# Patient Record
Sex: FEMALE | URBAN - METROPOLITAN AREA
[De-identification: names, ages, dates, MRNs, and addresses within clinical notes are randomized per-mention and may not be internally consistent; named-entity substitution may affect disease eponyms.]

---

## 2018-08-01 ENCOUNTER — RECORDS - HEALTHEAST (OUTPATIENT)
Dept: LAB | Facility: CLINIC | Age: 83
End: 2018-08-01

## 2018-08-02 LAB
ANION GAP SERPL CALCULATED.3IONS-SCNC: 8 MMOL/L (ref 5–18)
BUN SERPL-MCNC: 23 MG/DL (ref 8–28)
CALCIUM SERPL-MCNC: 9.5 MG/DL (ref 8.5–10.5)
CHLORIDE BLD-SCNC: 103 MMOL/L (ref 98–107)
CO2 SERPL-SCNC: 27 MMOL/L (ref 22–31)
CREAT SERPL-MCNC: 0.7 MG/DL (ref 0.6–1.1)
GFR SERPL CREATININE-BSD FRML MDRD: >60 ML/MIN/1.73M2
GLUCOSE BLD-MCNC: 80 MG/DL (ref 70–125)
POTASSIUM BLD-SCNC: 4 MMOL/L (ref 3.5–5)
SODIUM SERPL-SCNC: 138 MMOL/L (ref 136–145)

## 2019-09-04 ENCOUNTER — OFFICE VISIT (OUTPATIENT)
Dept: INTERNAL MEDICINE | Facility: CLINIC | Age: 84
End: 2019-09-04

## 2019-09-04 VITALS
HEART RATE: 73 BPM | DIASTOLIC BLOOD PRESSURE: 74 MMHG | RESPIRATION RATE: 22 BRPM | SYSTOLIC BLOOD PRESSURE: 130 MMHG | OXYGEN SATURATION: 93 % | TEMPERATURE: 97.3 F | WEIGHT: 109.6 LBS

## 2019-09-04 DIAGNOSIS — H40.9 GLAUCOMA OF RIGHT EYE, UNSPECIFIED GLAUCOMA TYPE: ICD-10-CM
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
## 2019-09-04 DIAGNOSIS — I10 ESSENTIAL HYPERTENSION: ICD-10-CM

## 2019-09-04 DIAGNOSIS — M25.561 CHRONIC PAIN OF RIGHT KNEE: ICD-10-CM

## 2019-09-04 DIAGNOSIS — K21.9 GASTROESOPHAGEAL REFLUX DISEASE, ESOPHAGITIS PRESENCE NOT SPECIFIED: Primary | ICD-10-CM

## 2019-09-04 DIAGNOSIS — M85.80 OSTEOPENIA, UNSPECIFIED LOCATION: ICD-10-CM

## 2019-09-04 DIAGNOSIS — H25.013 CATARACT CORTICAL, SENILE, BILATERAL: ICD-10-CM

## 2019-09-04 DIAGNOSIS — M25.511 CHRONIC PAIN IN RIGHT SHOULDER: ICD-10-CM

## 2019-09-04 DIAGNOSIS — I63.9 CEREBROVASCULAR ACCIDENT (CVA), UNSPECIFIED MECHANISM (HCC): ICD-10-CM

## 2019-09-04 DIAGNOSIS — G89.29 CHRONIC PAIN OF RIGHT KNEE: ICD-10-CM

## 2019-09-04 DIAGNOSIS — Z13.220 LIPID SCREENING: ICD-10-CM

## 2019-09-04 DIAGNOSIS — N39.0 FREQUENT UTI: ICD-10-CM

## 2019-09-04 DIAGNOSIS — Z85.3 HISTORY OF BREAST CANCER: ICD-10-CM

## 2019-09-04 DIAGNOSIS — R53.81 PHYSICAL DECONDITIONING: ICD-10-CM

## 2019-09-04 DIAGNOSIS — G89.29 CHRONIC PAIN IN RIGHT SHOULDER: ICD-10-CM

## 2019-09-04 DIAGNOSIS — H35.30 MACULAR DEGENERATION OF BOTH EYES, UNSPECIFIED TYPE: ICD-10-CM

## 2019-09-04 LAB
ALBUMIN SERPL-MCNC: 4.9 G/DL (ref 3.5–5.2)
ALBUMIN/GLOB SERPL: 1.8 G/DL
ALP SERPL-CCNC: 76 U/L (ref 39–117)
ALT SERPL W P-5'-P-CCNC: 10 U/L (ref 1–33)
ANION GAP SERPL CALCULATED.3IONS-SCNC: 14.4 MMOL/L (ref 5–15)
AST SERPL-CCNC: 19 U/L (ref 1–32)
BASOPHILS # BLD AUTO: 0.02 10*3/MM3 (ref 0–0.2)
BASOPHILS NFR BLD AUTO: 0.4 % (ref 0–1.5)
BILIRUB SERPL-MCNC: <0.2 MG/DL (ref 0.2–1.2)
BUN BLD-MCNC: 22 MG/DL (ref 8–23)
BUN/CREAT SERPL: 22.9 (ref 7–25)
CALCIUM SPEC-SCNC: 9.4 MG/DL (ref 8.2–9.6)
CHLORIDE SERPL-SCNC: 92 MMOL/L (ref 98–107)
CHOLEST SERPL-MCNC: 173 MG/DL (ref 0–200)
CO2 SERPL-SCNC: 24.6 MMOL/L (ref 22–29)
CREAT BLD-MCNC: 0.96 MG/DL (ref 0.57–1)
DEPRECATED RDW RBC AUTO: 51.1 FL (ref 37–54)
EOSINOPHIL # BLD AUTO: 0.14 10*3/MM3 (ref 0–0.4)
EOSINOPHIL NFR BLD AUTO: 2.5 % (ref 0.3–6.2)
ERYTHROCYTE [DISTWIDTH] IN BLOOD BY AUTOMATED COUNT: 13.3 % (ref 12.3–15.4)
GFR SERPL CREATININE-BSD FRML MDRD: 54 ML/MIN/1.73
GLOBULIN UR ELPH-MCNC: 2.7 GM/DL
GLUCOSE BLD-MCNC: 77 MG/DL (ref 65–99)
HCT VFR BLD AUTO: 38.7 % (ref 34–46.6)
HDLC SERPL-MCNC: 58 MG/DL (ref 40–60)
HGB BLD-MCNC: 12.3 G/DL (ref 12–15.9)
IMM GRANULOCYTES # BLD AUTO: 0.02 10*3/MM3 (ref 0–0.05)
IMM GRANULOCYTES NFR BLD AUTO: 0.4 % (ref 0–0.5)
LDLC SERPL CALC-MCNC: 79 MG/DL (ref 0–100)
LDLC/HDLC SERPL: 1.36 {RATIO}
LYMPHOCYTES # BLD AUTO: 1.04 10*3/MM3 (ref 0.7–3.1)
LYMPHOCYTES NFR BLD AUTO: 18.4 % (ref 19.6–45.3)
MCH RBC QN AUTO: 32.9 PG (ref 26.6–33)
MCHC RBC AUTO-ENTMCNC: 31.8 G/DL (ref 31.5–35.7)
MCV RBC AUTO: 103.5 FL (ref 79–97)
MONOCYTES # BLD AUTO: 0.8 10*3/MM3 (ref 0.1–0.9)
MONOCYTES NFR BLD AUTO: 14.2 % (ref 5–12)
NEUTROPHILS # BLD AUTO: 3.63 10*3/MM3 (ref 1.7–7)
NEUTROPHILS NFR BLD AUTO: 64.1 % (ref 42.7–76)
NRBC BLD AUTO-RTO: 0 /100 WBC (ref 0–0.2)
PLATELET # BLD AUTO: 253 10*3/MM3 (ref 140–450)
PMV BLD AUTO: 9.8 FL (ref 6–12)
POTASSIUM BLD-SCNC: 4.2 MMOL/L (ref 3.5–5.2)
PROT SERPL-MCNC: 7.6 G/DL (ref 6–8.5)
RBC # BLD AUTO: 3.74 10*6/MM3 (ref 3.77–5.28)
SODIUM BLD-SCNC: 131 MMOL/L (ref 136–145)
TRIGL SERPL-MCNC: 181 MG/DL (ref 0–150)
VLDLC SERPL-MCNC: 36.2 MG/DL (ref 5–40)
WBC NRBC COR # BLD: 5.65 10*3/MM3 (ref 3.4–10.8)

## 2019-09-04 PROCEDURE — 80053 COMPREHEN METABOLIC PANEL: CPT | Performed by: NURSE PRACTITIONER

## 2019-09-04 PROCEDURE — 99204 OFFICE O/P NEW MOD 45 MIN: CPT | Performed by: NURSE PRACTITIONER

## 2019-09-04 PROCEDURE — 80061 LIPID PANEL: CPT | Performed by: NURSE PRACTITIONER

## 2019-09-04 PROCEDURE — 85025 COMPLETE CBC W/AUTO DIFF WBC: CPT | Performed by: NURSE PRACTITIONER

## 2019-09-04 PROCEDURE — 81003 URINALYSIS AUTO W/O SCOPE: CPT | Performed by: NURSE PRACTITIONER

## 2019-09-04 PROCEDURE — 36415 COLL VENOUS BLD VENIPUNCTURE: CPT | Performed by: NURSE PRACTITIONER

## 2019-09-04 RX ORDER — AMLODIPINE BESYLATE 10 MG/1
TABLET ORAL
COMMUNITY
Start: 2019-08-07 | End: 2019-11-14 | Stop reason: SDUPTHER

## 2019-09-04 RX ORDER — METOPROLOL SUCCINATE 50 MG/1
TABLET, EXTENDED RELEASE ORAL
COMMUNITY
Start: 2019-06-19 | End: 2020-02-04 | Stop reason: SDUPTHER

## 2019-09-04 RX ORDER — CEPHALEXIN 250 MG/1
CAPSULE ORAL
COMMUNITY
Start: 2019-07-19 | End: 2019-10-01 | Stop reason: SDUPTHER

## 2019-09-04 RX ORDER — DORZOLAMIDE HCL 20 MG/ML
SOLUTION/ DROPS OPHTHALMIC
COMMUNITY
Start: 2019-07-03 | End: 2020-01-21

## 2019-09-04 RX ORDER — PANTOPRAZOLE SODIUM 40 MG/1
TABLET, DELAYED RELEASE ORAL
COMMUNITY
Start: 2019-07-16 | End: 2019-10-21 | Stop reason: SDUPTHER

## 2019-09-04 RX ORDER — BENAZEPRIL HYDROCHLORIDE 40 MG/1
TABLET, FILM COATED ORAL
COMMUNITY
Start: 2019-08-07 | End: 2019-11-14 | Stop reason: SDUPTHER

## 2019-09-04 NOTE — PROGRESS NOTES
"Chief Complaint   Patient presents with   • Establish Care   • Recent Fall   • Urinary Tract Infection   • Med Refill        Subjective     History of Present Illness     Patient is here today to establish care.      bp up mild today.     The patient has hypertension and takes Lotensin, Norvasc, and metoprolol.  Denies any chest pain shortness of breath swelling.  Patient has a history of CVA.  CVA was one year ago -inpt to [facility] and did not have a neurologist.  Patient has osteopenia- Calcium vitamin D daily, muscle strengthening. But not lately due to weqakness especially since recent 2.5 weeks ago.  Patient has GERD and takes pantoprazole daily.  scholtsky ring was noted when she was inpt for cva and had scholstky ring and repeat egd the ring was gone. She was never symptomatic with gerd sxs. Patient has frequent UTIs and is on Keflex daily.  She was started by urology. Symptoms have been helpful but did not have resolution completely and she has been a lot at  wheich usuallyher symptoms and her daughter wants this checked today.  Usually keep records of how many times voiding at  but recently she has not been doing this.  Patient has bilateral cataracts.  Patient has history of breast cancer of R mastectomy not followed by chemo radiation not in nodes and had at [facility].she was dismessed form followup at [place] .  Patient also has history of fractures-year ago R wirst and pubic bone \"cracks\" fractures 5 years ago.     Patient has had a fall approximately 2.5 weeks ago did not seek care. She was using walker when fell and was turning and fell backwards.   Head hit wood floor and hit L elbow which has tiny scab and not hurtign now.  She hit back of head and had bump which has now resolved.     She overall has had weakness.  L side from cva.  R knee pain no treatment waers brace. Severe arthritis R shoulder R knee.  They do home exercises.         Allergic to Bactrim?  Reaction    Immunizations shingles " 3/19 at [facility]; flu 9/18 due    Health screenings DEXA bone scan 1/19; ophthalmology exam 7/19; mammogram winter 2018 or 19    Social history she is  and retired no alcohol use no drug use no smoking    Surgical history tonsillectomy age 12; mastectomy 2009; partial thyroidectomy age 48; dental work with teeth extraction age 44    Family medical history Father arthritis; mother high blood pressure; mother stroke; heart attack       Current Outpatient Medications:   •  amLODIPine (NORVASC) 10 MG tablet, , Disp: , Rfl:   •  benazepril (LOTENSIN) 40 MG tablet, , Disp: , Rfl:   •  cephalexin (KEFLEX) 250 MG capsule, , Disp: , Rfl:   •  dorzolamide (TRUSOPT) 2 % ophthalmic solution, , Disp: , Rfl:   •  metoprolol succinate XL (TOPROL-XL) 50 MG 24 hr tablet, , Disp: , Rfl:   •  Multiple Vitamins-Minerals (CENTRUM SILVER PO), Take  by mouth., Disp: , Rfl:   •  Multiple Vitamins-Minerals (PRESERVISION AREDS PO), Take  by mouth., Disp: , Rfl:   •  pantoprazole (PROTONIX) 40 MG EC tablet, , Disp: , Rfl:     No problem-specific Assessment & Plan notes found for this encounter.      The following portions of the patient's history were reviewed and updated as appropriate: allergies, current medications, past family history, past medical history, past social history, past surgical history and problem list.    Review of Systems   Constitutional: Negative for activity change, appetite change, chills, fatigue and fever.   HENT: Negative for congestion, postnasal drip and sore throat.    Eyes: Negative for visual disturbance.   Respiratory: Negative for cough and shortness of breath.    Cardiovascular: Negative for chest pain, palpitations and leg swelling.   Gastrointestinal: Negative for abdominal pain, constipation, diarrhea, nausea and GERD.   Musculoskeletal: Negative for arthralgias and myalgias.   Skin: Negative for rash.   Allergic/Immunologic: Negative for environmental allergies.   Neurological: Negative for  dizziness.   Psychiatric/Behavioral: Negative for sleep disturbance.   All other systems reviewed and are negative.      Objective   Physical Exam   Constitutional: She is oriented to person, place, and time. She appears well-developed and well-nourished.   HENT:   Head: Normocephalic and atraumatic.   Right Ear: External ear normal.   Left Ear: External ear normal.   Nose: Nose normal.   Mouth/Throat: Oropharynx is clear and moist.   Neck: No thyromegaly present.   Cardiovascular: Normal rate, regular rhythm and intact distal pulses.   Pulmonary/Chest: Effort normal and breath sounds normal.   Abdominal: Soft. Bowel sounds are normal. She exhibits no distension. There is no tenderness.   Musculoskeletal: She exhibits no edema.   Lymphadenopathy:     She has no cervical adenopathy.   Neurological: She is alert and oriented to person, place, and time.   Skin: Capillary refill takes 2 to 3 seconds.   Psychiatric: She has a normal mood and affect. Her behavior is normal.   Nursing note and vitals reviewed.          No results found for this or any previous visit.     Assessment/Plan   Anita was seen today for establish care, recent fall, urinary tract infection and med refill.    Diagnoses and all orders for this visit:    Gastroesophageal reflux disease, esophagitis presence not specified  -     CBC & Differential  -     Comprehensive Metabolic Panel    Cataract cortical, senile, bilateral    Frequent UTI  -     POC Urinalysis Dipstick, Automated  -     Ambulatory Referral to Urology    History of breast cancer    Fall, initial encounter    Essential hypertension  -     Comprehensive Metabolic Panel  -     POC Urinalysis Dipstick, Automated    Osteopenia, unspecified location    Cerebrovascular accident (CVA), unspecified mechanism (CMS/HCC)    Physical deconditioning  -     Ambulatory Referral to Physical Therapy Evaluate and treat    Chronic pain of right knee  -     Ambulatory Referral to Orthopedic  Surgery    Chronic pain in right shoulder  -     Ambulatory Referral to Orthopedic Surgery    Glaucoma of right eye, unspecified glaucoma type  -     Ambulatory Referral to Ophthalmology    Macular degeneration of both eyes, unspecified type  -     Ambulatory Referral to Ophthalmology    Lipid screening  -     Lipid Panel    addendum  Reviewed with patient option for home health due to CVA multiple chronic joint pains and limited with macular degeneration and glaucoma.  Patient return call today are interested in home health I will set that up.  Return in about 1 month (around 10/4/2019) for Recheck.  RTC/call  If symptoms worsen  Meds MOA and SE's reviewed and pt v/u

## 2019-09-05 LAB
BILIRUB BLD-MCNC: NEGATIVE MG/DL
CLARITY, POC: ABNORMAL
COLOR UR: YELLOW
EXPIRATION DATE: ABNORMAL
GLUCOSE UR STRIP-MCNC: NEGATIVE MG/DL
KETONES UR QL: NEGATIVE
LEUKOCYTE EST, POC: ABNORMAL
Lab: ABNORMAL
NITRITE UR-MCNC: NEGATIVE MG/ML
PH UR: 7 [PH] (ref 5–8)
PROT UR STRIP-MCNC: NEGATIVE MG/DL
RBC # UR STRIP: ABNORMAL /UL
SP GR UR: 1 (ref 1–1.03)
UROBILINOGEN UR QL: NORMAL

## 2019-09-05 PROCEDURE — 87086 URINE CULTURE/COLONY COUNT: CPT | Performed by: NURSE PRACTITIONER

## 2019-09-05 PROCEDURE — 81015 MICROSCOPIC EXAM OF URINE: CPT | Performed by: NURSE PRACTITIONER

## 2019-09-06 ENCOUNTER — TELEPHONE (OUTPATIENT)
Dept: INTERNAL MEDICINE | Facility: CLINIC | Age: 84
End: 2019-09-06

## 2019-09-06 ENCOUNTER — HOSPITAL ENCOUNTER (OUTPATIENT)
Dept: CT IMAGING | Facility: HOSPITAL | Age: 84
Discharge: HOME | End: 2019-09-06
Admitting: NURSE PRACTITIONER

## 2019-09-06 DIAGNOSIS — M54.41 ACUTE BILATERAL LOW BACK PAIN WITH BILATERAL SCIATICA: ICD-10-CM

## 2019-09-06 DIAGNOSIS — M54.41 ACUTE BILATERAL LOW BACK PAIN WITH BILATERAL SCIATICA: Primary | ICD-10-CM

## 2019-09-06 DIAGNOSIS — M54.42 ACUTE BILATERAL LOW BACK PAIN WITH BILATERAL SCIATICA: ICD-10-CM

## 2019-09-06 DIAGNOSIS — M54.42 ACUTE BILATERAL LOW BACK PAIN WITH BILATERAL SCIATICA: Primary | ICD-10-CM

## 2019-09-06 PROCEDURE — 74176 CT ABD & PELVIS W/O CONTRAST: CPT

## 2019-09-06 NOTE — TELEPHONE ENCOUNTER
Pt's daughter was notified. Please see other telephone note. She will be getting phone call in next hour or two with CT information. Nothing further needed from this message.

## 2019-09-06 NOTE — TELEPHONE ENCOUNTER
Chaparrita Pepper calling for her mother Anita Cook. She wants to know if she can get a stronger pain medicine than the extra strength tylenol for her arthritis in legs that radiate up her neck and to her arms. Chaparrita said at her appt this week when she rated her pain Anita said a 4 but is in so much pain that she can barely walk. If she needs to be seen for this she would like to get her in today. Chaparrita can be reached at [phone]  She uses the [facility] on [address]

## 2019-09-06 NOTE — TELEPHONE ENCOUNTER
Spoke with pt's daughter. After sending this message Rosa rcvd results from [facility] and patient may have a kidney stone according to the lab work. She advised a STAT CT scan for patient, daughter is agreeable. She verbalized understanding that if we cannot get scheduled today she should take pt to the ER rather than waiting for Monday.

## 2019-09-06 NOTE — TELEPHONE ENCOUNTER
----- Message from SHANTA Ray sent at 9/6/2019  2:39 PM EDT -----  CT hematuria protocol stat for worsening lumbar pain in the legs.  Pain quick onset with hematuria.  Urine culture negative.

## 2019-10-01 ENCOUNTER — TELEPHONE (OUTPATIENT)
Dept: INTERNAL MEDICINE | Facility: CLINIC | Age: 84
End: 2019-10-01

## 2019-10-01 ENCOUNTER — OFFICE VISIT (OUTPATIENT)
Dept: INTERNAL MEDICINE | Facility: CLINIC | Age: 84
End: 2019-10-01

## 2019-10-01 VITALS
WEIGHT: 110 LBS | DIASTOLIC BLOOD PRESSURE: 62 MMHG | SYSTOLIC BLOOD PRESSURE: 120 MMHG | HEART RATE: 70 BPM | OXYGEN SATURATION: 97 % | RESPIRATION RATE: 14 BRPM

## 2019-10-01 DIAGNOSIS — G89.29 CHRONIC PAIN IN RIGHT SHOULDER: ICD-10-CM

## 2019-10-01 DIAGNOSIS — M25.562 CHRONIC PAIN OF BOTH KNEES: ICD-10-CM

## 2019-10-01 DIAGNOSIS — M25.511 CHRONIC PAIN IN RIGHT SHOULDER: ICD-10-CM

## 2019-10-01 DIAGNOSIS — G89.29 CHRONIC PAIN OF BOTH KNEES: ICD-10-CM

## 2019-10-01 DIAGNOSIS — Z87.440 HISTORY OF RECURRENT UTI (URINARY TRACT INFECTION): Primary | ICD-10-CM

## 2019-10-01 DIAGNOSIS — L98.9 SKIN LESION: ICD-10-CM

## 2019-10-01 DIAGNOSIS — M25.561 CHRONIC PAIN OF BOTH KNEES: ICD-10-CM

## 2019-10-01 PROCEDURE — G0008 ADMIN INFLUENZA VIRUS VAC: HCPCS | Performed by: NURSE PRACTITIONER

## 2019-10-01 PROCEDURE — 90653 IIV ADJUVANT VACCINE IM: CPT | Performed by: NURSE PRACTITIONER

## 2019-10-01 PROCEDURE — 99214 OFFICE O/P EST MOD 30 MIN: CPT | Performed by: NURSE PRACTITIONER

## 2019-10-01 RX ORDER — CEPHALEXIN 250 MG/1
250 CAPSULE ORAL DAILY
Qty: 30 CAPSULE | Refills: 11 | Status: SHIPPED | OUTPATIENT
Start: 2019-10-01 | End: 2020-07-13

## 2019-10-01 NOTE — PROGRESS NOTES
Chief Complaint   Patient presents with   • Fall     subsequent encounter, pt still very weak, pt is in PT and OT        Subjective     History of Present Illness   Patient is here today for follow-up on recent [facility] care visit.    Patient is living at home.  The patient has chronic shoulder and knee pain she is awaiting injection from orthopedics.    Patient has hematuria.  Culture was negative.  Patient does have chronic UTIs and takes Keflex prophylactically.  She is requesting a refill today.  Patient had CT of the abdomen and pelvis which was negative.  She is followed with urology where she lived previously and thinks she has had a work-up in the past.  I will obtain records.  She has urology consult placed at her last visit.    Patient has a history of stroke and a week on the left side.     Patient is recently seen podiatry for left foot third nail which was removed due to recent trauma of the nail.  She is recovering well.    Patient is new skin finding that she was evaluated.  Areas noted is been no treatment the area does not itch and is not painful.  There is been no trauma.    She reports that intermittently she has a lump in her right upper quadrant.  There is no associated nausea vomiting diarrhea mild discomfort.  No relieving factors.        The following portions of the patient's history were reviewed and updated as appropriate: allergies, current medications, past family history, past medical history, past social history, past surgical history and problem list.    Review of Systems   Constitutional: Negative for activity change, appetite change, chills, fatigue and fever.   HENT: Negative for congestion, postnasal drip and sore throat.    Eyes: Negative for visual disturbance.   Respiratory: Negative for cough and shortness of breath.    Cardiovascular: Negative for chest pain, palpitations and leg swelling.   Gastrointestinal: Positive for abdominal pain. Negative for constipation, diarrhea,  nausea and GERD.   Musculoskeletal: Positive for arthralgias and back pain. Negative for myalgias.   Skin: Positive for skin lesions. Negative for rash.   Allergic/Immunologic: Negative for environmental allergies.   Neurological: Negative for dizziness.   Psychiatric/Behavioral: Negative for sleep disturbance.   All other systems reviewed and are negative.      Objective   Physical Exam   Constitutional: She is oriented to person, place, and time. She appears well-developed and well-nourished.   HENT:   Head: Normocephalic and atraumatic.   Cardiovascular: Normal rate and regular rhythm.   Pulmonary/Chest: Effort normal and breath sounds normal.   Abdominal: Soft. Bowel sounds are normal. She exhibits no distension and no mass. There is no tenderness. There is no rebound and no guarding. No hernia.   Musculoskeletal:   Shoulders bilaterally.  There is no erythema edema warmth of the shoulder bilaterally.   Neurological: She is alert and oriented to person, place, and time.   Skin: Skin is warm and dry. Capillary refill takes 2 to 3 seconds.   Half centimeter red scaly area nontender   Psychiatric: She has a normal mood and affect. Her behavior is normal.   Nursing note and vitals reviewed.         Assessment/Plan   Anita was seen today for fall.    Diagnoses and all orders for this visit:    History of recurrent UTI (urinary tract infection)  -     cephalexin (KEFLEX) 250 MG capsule; Take 1 capsule by mouth Daily.    Chronic pain in right shoulder  -     diclofenac (VOLTAREN) 1 % gel gel; Apply 4 g topically to the appropriate area as directed 4 (Four) Times a Day.    Chronic pain of both knees  -     diclofenac (VOLTAREN) 1 % gel gel; Apply 4 g topically to the appropriate area as directed 4 (Four) Times a Day.    Skin lesion  -     Ambulatory Referral to Dermatology    Other orders  -     Fluad Tri 65yr+    hematuria-awaiting records from urology-[facility] in [facility] in [facility]      Skin lesion likely advanced AK versus SCC/BCC    Return for Medicare Wellnes in 2 months AND 4 months a fasting follow up .  RTC/call  If symptoms worsen  Meds MOA and SE's reviewed and pt v/u

## 2019-10-01 NOTE — TELEPHONE ENCOUNTER
Spoke with Aidan at patients local [facility]. He states he can see where patient rcvd vaccine in [place] 06/18/2018. Provided [phone] as the phone number for that pharmacy. I called but pharmacy is closed and will re open at 2pm central time. I will call back later. For now chart has been updated.

## 2019-10-17 ENCOUNTER — OFFICE VISIT (OUTPATIENT)
Dept: ORTHOPEDIC SURGERY | Facility: CLINIC | Age: 84
End: 2019-10-17

## 2019-10-17 VITALS — HEART RATE: 81 BPM | WEIGHT: 110.01 LBS | BODY MASS INDEX: 21.6 KG/M2 | HEIGHT: 60 IN | OXYGEN SATURATION: 95 %

## 2019-10-17 DIAGNOSIS — G89.29 CHRONIC PAIN OF BOTH KNEES: Primary | ICD-10-CM

## 2019-10-17 DIAGNOSIS — M25.512 CHRONIC PAIN OF BOTH SHOULDERS: ICD-10-CM

## 2019-10-17 DIAGNOSIS — M19.011 PRIMARY OSTEOARTHRITIS OF BOTH SHOULDERS: ICD-10-CM

## 2019-10-17 DIAGNOSIS — M19.012 PRIMARY OSTEOARTHRITIS OF BOTH SHOULDERS: ICD-10-CM

## 2019-10-17 DIAGNOSIS — M25.562 CHRONIC PAIN OF BOTH KNEES: Primary | ICD-10-CM

## 2019-10-17 DIAGNOSIS — M25.511 CHRONIC PAIN OF BOTH SHOULDERS: ICD-10-CM

## 2019-10-17 DIAGNOSIS — M17.0 PRIMARY OSTEOARTHRITIS OF BOTH KNEES: ICD-10-CM

## 2019-10-17 DIAGNOSIS — M25.561 CHRONIC PAIN OF BOTH KNEES: Primary | ICD-10-CM

## 2019-10-17 DIAGNOSIS — G89.29 CHRONIC PAIN OF BOTH SHOULDERS: ICD-10-CM

## 2019-10-17 PROCEDURE — 99204 OFFICE O/P NEW MOD 45 MIN: CPT | Performed by: PHYSICIAN ASSISTANT

## 2019-10-17 PROCEDURE — 20610 DRAIN/INJ JOINT/BURSA W/O US: CPT | Performed by: PHYSICIAN ASSISTANT

## 2019-10-17 RX ORDER — ASPIRIN 81 MG/1
81 TABLET, CHEWABLE ORAL DAILY
COMMUNITY

## 2019-10-17 RX ORDER — IBUPROFEN 200 MG
200 TABLET ORAL EVERY 6 HOURS PRN
COMMUNITY

## 2019-10-17 RX ORDER — ACETAMINOPHEN 500 MG
500 TABLET ORAL EVERY 6 HOURS PRN
COMMUNITY

## 2019-10-17 RX ADMIN — LIDOCAINE HYDROCHLORIDE 4 ML: 10 INJECTION, SOLUTION EPIDURAL; INFILTRATION; INTRACAUDAL; PERINEURAL at 14:23

## 2019-10-17 RX ADMIN — TRIAMCINOLONE ACETONIDE 40 MG: 40 INJECTION, SUSPENSION INTRA-ARTICULAR; INTRAMUSCULAR at 14:23

## 2019-10-17 NOTE — PROGRESS NOTES
[facility] [place] [facility] Note    Subjective     Chief Complaint   Patient presents with   • Right Knee - Pain   • Left Knee - Pain        HPI  Anita Cook is a 89 y.o. female.  Patient presents for evaluation bilateral knees and shoulders.  Patient a unknown history of osteoarthritis to knees and shoulders, which she has been receiving corticosteroid injections every 3-4 months for some time.  Injections have only been given to right knee and shoulder, she reports good relief with injections for about 2 months.    Currently right knee and shoulder pain is flaring up again.  Last injections in July 2019 by prior provider in [place].  Reported pain scale 5/10.  Severity of pain moderate.  Quality of pain dull, aching, shooting (shoulder).  Pain associated with popping, grinding, stiffness and giving away.  Knee pain worse with walking, standing, stairs.  Shoulder pain worse with lifting, reaching.  No numbness or tingling into the extremities.  Using ibuprofen, Tylenol and injections for pain control.    History of stroke in July 2018 which affected her left side.    No reported fevers, chills, night sweats or other constitutional symptoms.    Past Medical History:   Diagnosis Date   • Arthritis    • Breast cancer (CMS/HCC)    • Cataracts, bilateral    • Fractures    • Frequent UTI    • GERD (gastroesophageal reflux disease)    • Glaucoma    • Hypertension    • Low bone density    • Stroke (cerebrum) (CMS/HCC)       Past Surgical History:   Procedure Laterality Date   • MASTECTOMY     • MULTIPLE TOOTH EXTRACTIONS     • THYROIDECTOMY, PARTIAL     • TONSILLECTOMY        Family History   Problem Relation Age of Onset   • Hypertension Mother    • Stroke Mother    • Arthritis Father    • Stroke Maternal Aunt      Social History     Socioeconomic History   • Marital status:      Spouse name: Not on file   • Number of children: Not on file   • Years of education: Not on file   • Highest education level:  Not on file   Tobacco Use   • Smoking status: Never Smoker   • Smokeless tobacco: Never Used   Substance and Sexual Activity   • Alcohol use: No     Frequency: Never   • Drug use: No      Current Outpatient Medications on File Prior to Visit   Medication Sig Dispense Refill   • acetaminophen (TYLENOL) 500 MG tablet Take 500 mg by mouth Every 6 (Six) Hours As Needed for Mild Pain .     • amLODIPine (NORVASC) 10 MG tablet      • aspirin 81 MG chewable tablet Chew 81 mg Daily.     • benazepril (LOTENSIN) 40 MG tablet      • Calcium-Phosphorus-Vitamin D (CITRACAL +D3 PO) Take 400 mg by mouth 4 (Four) Times a Day.     • cephalexin (KEFLEX) 250 MG capsule Take 1 capsule by mouth Daily. 30 capsule 11   • diclofenac (VOLTAREN) 1 % gel gel Apply 4 g topically to the appropriate area as directed 4 (Four) Times a Day. 100 g 2   • dorzolamide (TRUSOPT) 2 % ophthalmic solution      • ibuprofen (ADVIL,MOTRIN) 200 MG tablet Take 200 mg by mouth Every 6 (Six) Hours As Needed for Mild Pain .     • metoprolol succinate XL (TOPROL-XL) 50 MG 24 hr tablet      • Multiple Vitamins-Minerals (CENTRUM SILVER PO) Take  by mouth.     • Multiple Vitamins-Minerals (PRESERVISION AREDS PO) Take  by mouth.     • pantoprazole (PROTONIX) 40 MG EC tablet        No current facility-administered medications on file prior to visit.       Allergies   Allergen Reactions   • Bactrim [Sulfamethoxazole-Trimethoprim] Rash        The following portions of the patient's history were reviewed and updated as appropriate: allergies, current medications, past family history, past medical history, past social history, past surgical history and problem list.    Review of Systems   Constitutional: Positive for activity change.   HENT: Negative.    Eyes: Negative.    Respiratory: Negative.    Cardiovascular: Negative.    Gastrointestinal: Negative.    Endocrine: Negative.    Genitourinary: Negative.    Musculoskeletal: Positive for arthralgias (knee pain), gait problem  "and joint swelling.   Skin: Negative.    Allergic/Immunologic: Negative.    Hematological: Negative.    Psychiatric/Behavioral: Negative.         Objective      Physical Exam  Pulse 81   Ht 152.4 cm (60\")   Wt 49.9 kg (110 lb 0.2 oz)   SpO2 95%   BMI 21.48 kg/m²     Body mass index is 21.48 kg/m².    GENERAL APPEARANCE: awake, alert & oriented x 3, in no acute distress and well developed, well nourished  PSYCH: normal mood and affect  LUNGS:  breathing nonlabored, no wheezing  EYES: sclera anicteric, pupils equal  CARDIOVASCULAR: palpable pulses radius bilaterally. Capillary refill less than 2 seconds  INTEGUMENTARY: skin intact, no clubbing, cyanosis  NEUROLOGIC:  Normal Sensation        Ortho Exam  Bilateral Knee  Alignment: R valgus, L mild varus  Skin: Intact without any erythema, warmth, swelling or evidence of infection.  Motion: R 5-120 with crepitus, L 0-120 with crepitus.  Tenderness: R--lateral joint line, lateral soft tissue structures.  L--mild medial joint line.  Instability: L--Lachman  negative. Varus and valgus stress test negative.  R--Lachman negative, Valgus negative, Varus laxity LCL with endpoint  Meniscus: Shira's positive pain lateral right knee; negative left knee  Patella: Grind positive.  Strength: 4-/5 quads/HS  Motor: Grossly intact Q/HS/TA/GS/EHL/P  Sensory: Grossly intact DP/SP/S/S/T nerve distributions.   Vascular: 2+ DP/PT  pulses with brisk capillary refill into each digit.    Bilateral Shoulder  Skin: Intact without any erythema, warmth or swelling.    Tenderness: R--global tenderness including ACJ, L--minimal anterior and lateral shoulder and mild to ACJ.  Motion: R--limited secondary to severe end-stage OA.  L--, ER 45   No provacative testing secondary to pain  Motor: Grossly intact to Ax/MSC/R/U/M/AIN/PIN  Sensory: Grossly intact to Ax/MSC/R/U/M nerve distributions.  Vascular: 2+ radial pulse with brisk capillary refill into each digit.      Imaging/Studies  Ordered " bilateral knee plain films.  Images read by Dr. Leyva.    Imaging Results (last 7 days)     Procedure Component Value Units Date/Time    XR Shoulder 2+ View Bilateral [[id number]] Resulted:  10/17/19 1647     Updated:  10/17/19 1649    Narrative:       Indication: Bilateral shoulder pain    Comparison: No prior xrays are available for comparison      Impression:            Right shoulder 3 views: Radiographs demonstrate diffuse rotator cuff   arthropathy with proximal migration of the humeral head relative to the   glenoid.  There is medial erosion of the glenohumeral joint.  There   appears to be some slight eccentric wear posteriorly at the glenohumeral   articulation    Left shoulder 3 views: Radiographs demonstrate severe glenohumeral   arthritis with bone-on-bone articulation at the central portion of the   glenohumeral articulation.  There does appear to be significant arthritic   changes involving the glenohumeral joint with osteophytes at the inferior   portion of the humeral head and glenoid.    Bilateral shoulders demonstrate no discrete acute process or fracture.      XR Knee 4+ View Bilateral [[id number]] Resulted:  10/17/19 1646     Updated:  10/17/19 1647    Narrative:       Indication: Bilateral knee pain    Comparison: No prior xrays are available for comparison      Impression:            Right Knee: moderate to severe tricompartmental arthritis with genu valgum   alignment, periarticular osteophytes visualized in all compartments   Left Knee: mild tricompartmental osteoarthritis            Assessment/Plan        ICD-10-CM ICD-9-CM   1. Chronic pain of both knees M25.561 719.46    M25.562 338.29    G89.29    2. Chronic pain of both shoulders M25.511 719.41    G89.29 338.29    M25.512    3. Primary osteoarthritis of both knees M17.0 715.16   4. Primary osteoarthritis of both shoulders M19.011 715.11    M19.012        Orders Placed This Encounter   Procedures   • Large Joint Arthrocentesis: R knee   •  XR Knee 4+ View Bilateral   • XR Shoulder 2+ View Bilateral        -Chronic bilateral knee and shoulder pain due osteoarthritis.  -Offered and accepted corticosteroid to right knee.  Injection given today.  Left knee has never had injections and its doing okay today.  -Right shoulder still okay, patient will probably need to return in 2-3 weeks for right GHJ injection.  -Patient attended PT after stroke and is still doing exercises taught by PT to all extremities.  Declines formal PT as this time.  -Continue with over-the-counter pain medication as needed.  Currently taking ibuprofen and Tylenol.  -Follow up for knee in 6 weeks, sooner for shoulder if needed.  -Questions and concerns answered.    After discussing the risks, benefits, indications of injection, the patient gave consent to proceed.  Her right knee was confirmed as the correct joint to be injected with a timeout.  It was then prepped using Hibiclens and injected with a mixture of 4 cc of 1% plain lidocaine and 1 cc of Kenalog (40 mg per mL), without any resistance through the anterior medial approach, patient in seated position.  Area was cleaned, hemostasis was achieved and a Band-Aid was applied over the injection site.  The patient tolerated procedure well.  I instructed the patient on signs and symptoms of infection.  They should report to the emergency department or return to clinic if any of these develop, for further evaluation and treatment.  Recommended modifying activity for the next 48 hours to include rest, ice, elevation and oral pain medication as needed.      History, exam and imaging discussed with Dr. Leyva, who agreed with assessment and plan.      Medical Decision Making  Management Options : over-the-counter medicine and prescription/IM medicine  Data/Risk: radiology tests       Cecy Camacho PA-C  10/21/19  9:13 PM         EMR Dragon/Transcription disclaimer:  Much of this encounter note is an electronic transcription of  spoken language to printed text. Electronic transcription of spoken language may permit erroneous, or at times, nonsensical words or phrases to be inadvertently transcribed. Although I have reviewed the note for such errors, some may still exist.

## 2019-10-17 NOTE — PROGRESS NOTES
Procedure   Large Joint Arthrocentesis: R knee  Date/Time: 10/17/2019 2:23 PM  Consent given by: patient  Site marked: site marked  Timeout: Immediately prior to procedure a time out was called to verify the correct patient, procedure, equipment, support staff and site/side marked as required   Supporting Documentation  Indications: pain   Procedure Details  Location: knee - R knee  Preparation: Patient was prepped and draped in the usual sterile fashion  Needle size: 22 G  Approach: anterolateral  Medications administered: 40 mg triamcinolone acetonide 40 MG/ML; 4 mL lidocaine PF 1% 1 %  Patient tolerance: patient tolerated the procedure well with no immediate complications

## 2019-10-18 ENCOUNTER — TELEPHONE (OUTPATIENT)
Dept: INTERNAL MEDICINE | Facility: CLINIC | Age: 84
End: 2019-10-18

## 2019-10-18 NOTE — TELEPHONE ENCOUNTER
Has home health started care for pt?    Referral was placed 9/14/19 and I have sent several messages to check on

## 2019-10-21 RX ORDER — TRIAMCINOLONE ACETONIDE 40 MG/ML
40 INJECTION, SUSPENSION INTRA-ARTICULAR; INTRAMUSCULAR
Status: COMPLETED | OUTPATIENT
Start: 2019-10-17 | End: 2019-10-17

## 2019-10-21 RX ORDER — LIDOCAINE HYDROCHLORIDE 10 MG/ML
4 INJECTION, SOLUTION EPIDURAL; INFILTRATION; INTRACAUDAL; PERINEURAL
Status: COMPLETED | OUTPATIENT
Start: 2019-10-17 | End: 2019-10-17

## 2019-10-21 NOTE — TELEPHONE ENCOUNTER
Patient's daughter requested a 90 day supply be called into her pharmacy. She had this prescribed by her old PCP and it hasn't been filled by you yet. Okay to send in?

## 2019-10-21 NOTE — TELEPHONE ENCOUNTER
Spoke with patient's daughter. She stated that she doesn't think they need the home health anymore. She will discuss it again at her follow up appointment in December.

## 2019-10-21 NOTE — TELEPHONE ENCOUNTER
M at [phone] for Chaparrita, patient's daughter to return our call, office number was provided

## 2019-10-29 RX ORDER — PANTOPRAZOLE SODIUM 40 MG/1
40 TABLET, DELAYED RELEASE ORAL DAILY
Qty: 90 TABLET | Refills: 3 | Status: SHIPPED | OUTPATIENT
Start: 2019-10-29 | End: 2020-02-04 | Stop reason: SDUPTHER

## 2019-11-14 DIAGNOSIS — M25.561 CHRONIC PAIN OF BOTH KNEES: ICD-10-CM

## 2019-11-14 DIAGNOSIS — G89.29 CHRONIC PAIN IN RIGHT SHOULDER: ICD-10-CM

## 2019-11-14 DIAGNOSIS — M25.511 CHRONIC PAIN IN RIGHT SHOULDER: ICD-10-CM

## 2019-11-14 DIAGNOSIS — M25.562 CHRONIC PAIN OF BOTH KNEES: ICD-10-CM

## 2019-11-14 DIAGNOSIS — G89.29 CHRONIC PAIN OF BOTH KNEES: ICD-10-CM

## 2019-11-14 RX ORDER — AMLODIPINE BESYLATE 10 MG/1
10 TABLET ORAL DAILY
Qty: 30 TABLET | Refills: 5 | Status: SHIPPED | OUTPATIENT
Start: 2019-11-14 | End: 2020-02-04 | Stop reason: SDUPTHER

## 2019-11-14 RX ORDER — BENAZEPRIL HYDROCHLORIDE 40 MG/1
40 TABLET, FILM COATED ORAL DAILY
Qty: 30 TABLET | Refills: 5 | Status: SHIPPED | OUTPATIENT
Start: 2019-11-14 | End: 2020-02-04 | Stop reason: SDUPTHER

## 2019-12-05 ENCOUNTER — OFFICE VISIT (OUTPATIENT)
Dept: ORTHOPEDIC SURGERY | Facility: CLINIC | Age: 84
End: 2019-12-05

## 2019-12-05 VITALS — OXYGEN SATURATION: 92 % | HEART RATE: 85 BPM | BODY MASS INDEX: 21.6 KG/M2 | HEIGHT: 60 IN | WEIGHT: 110.01 LBS

## 2019-12-05 DIAGNOSIS — M25.512 CHRONIC PAIN OF BOTH SHOULDERS: ICD-10-CM

## 2019-12-05 DIAGNOSIS — G89.29 CHRONIC PAIN OF BOTH SHOULDERS: ICD-10-CM

## 2019-12-05 DIAGNOSIS — M25.511 CHRONIC PAIN OF BOTH SHOULDERS: ICD-10-CM

## 2019-12-05 DIAGNOSIS — M17.0 PRIMARY OSTEOARTHRITIS OF BOTH KNEES: ICD-10-CM

## 2019-12-05 DIAGNOSIS — G89.29 CHRONIC PAIN OF BOTH KNEES: Primary | ICD-10-CM

## 2019-12-05 DIAGNOSIS — M19.011 PRIMARY OSTEOARTHRITIS OF BOTH SHOULDERS: ICD-10-CM

## 2019-12-05 DIAGNOSIS — M25.561 CHRONIC PAIN OF BOTH KNEES: Primary | ICD-10-CM

## 2019-12-05 DIAGNOSIS — M19.012 PRIMARY OSTEOARTHRITIS OF BOTH SHOULDERS: ICD-10-CM

## 2019-12-05 DIAGNOSIS — M25.562 CHRONIC PAIN OF BOTH KNEES: Primary | ICD-10-CM

## 2019-12-05 PROCEDURE — 99213 OFFICE O/P EST LOW 20 MIN: CPT | Performed by: PHYSICIAN ASSISTANT

## 2019-12-05 NOTE — PROGRESS NOTES
"    [facility] [place] Orthopaedic Surgery Clinic Note    Subjective     CC: Follow-up (7 weeks- Primary osteoarthritis of both knees, Primary osteoarthritis of both shoulders)      HPI    Anita Cook is a 89 y.o. female.  Patient returns today for follow-up bilateral shoulder pain and right knee pain.  She has a known history of osteoarthritis to the knees and shoulders which she has been receiving corticosteroid injections to every 3 or 4 months.  She notes couple months of relief following these injections.    Currently she endorses a pain scale of 6/10.  Severity the pain moderate.  Quality the pain dull.  Associated symptoms grinding, stiffness and giving way.  Symptoms are worse with walking, standing and stair climbing she currently uses extra strength Tylenol and Voltaren gel for pain control.  Patient uses both walker and wheelchair to assist with ambulation secondary to the pain in her knee.      ROS:    Constiutional:Pt denies fever, chills, nausea, or vomiting.  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    Patient is well nourished and well developed.        Ortho Exam  Limited exam of bilateral shoulders and right knee  Palpable tenderness to all joints stated above.  Positive grinding and crepitus noted with any range of motion.  Range of motion of the shoulders as well as knee limited secondary to pain.  Motor/sensory remains grossly intact C5-T1, upper extremities and L2-S1, lower extremity.      Imaging/Labs/EMG Reviewed:  No new imaging today.      Assessment:  1. Chronic pain " of both knees    2. Chronic pain of both shoulders    3. Primary osteoarthritis of both knees    4. Primary osteoarthritis of both shoulders        Plan:  1. Chronic bilateral shoulder and knee pain due to osteoarthritis to all joints.  Positive end-stage arthritis noted to both shoulders as well as right knee.  2. Offered to provide the patient with bilateral corticosteroid injections to each shoulder.  She declined today because she has to travel to [place] this weekend and does not want to have the injections at this point.  She wishes to return next week for the injections.  3. Unfortunately is too soon for a corticosteroid injection to the right knee.  He even though she does have end-stage bone-on-bone osteoarthritis will try Visco supplementation series to the right knee.  Depending on her response would consider possible referral for pain management for genicular blocks.  4. Her family did ask about possible right TKA.  Spoke with Dr. Leyva and unfortunately based on age and comorbidities she is not a candidate for this procedure.  5. Patient follow-up next Thursday for bilateral shoulder corticosteroid injections and start of Visco supplementation series to right knee.  6. Questions and concerns answered.      Cecy Camacho PA-C  12/09/19  9:29 AM

## 2019-12-12 ENCOUNTER — OFFICE VISIT (OUTPATIENT)
Dept: ORTHOPEDIC SURGERY | Facility: CLINIC | Age: 84
End: 2019-12-12

## 2019-12-12 VITALS — HEIGHT: 60 IN | OXYGEN SATURATION: 96 % | HEART RATE: 86 BPM | WEIGHT: 110.01 LBS | BODY MASS INDEX: 21.6 KG/M2

## 2019-12-12 DIAGNOSIS — M25.562 CHRONIC PAIN OF BOTH KNEES: ICD-10-CM

## 2019-12-12 DIAGNOSIS — M19.011 PRIMARY OSTEOARTHRITIS OF BOTH SHOULDERS: ICD-10-CM

## 2019-12-12 DIAGNOSIS — M19.012 PRIMARY OSTEOARTHRITIS OF BOTH SHOULDERS: ICD-10-CM

## 2019-12-12 DIAGNOSIS — M25.561 CHRONIC PAIN OF BOTH KNEES: ICD-10-CM

## 2019-12-12 DIAGNOSIS — M17.0 PRIMARY OSTEOARTHRITIS OF BOTH KNEES: Primary | ICD-10-CM

## 2019-12-12 DIAGNOSIS — G89.29 CHRONIC PAIN OF BOTH SHOULDERS: ICD-10-CM

## 2019-12-12 DIAGNOSIS — M25.511 CHRONIC PAIN OF BOTH SHOULDERS: ICD-10-CM

## 2019-12-12 DIAGNOSIS — G89.29 CHRONIC PAIN OF BOTH KNEES: ICD-10-CM

## 2019-12-12 DIAGNOSIS — M25.512 CHRONIC PAIN OF BOTH SHOULDERS: ICD-10-CM

## 2019-12-12 PROCEDURE — 20610 DRAIN/INJ JOINT/BURSA W/O US: CPT | Performed by: PHYSICIAN ASSISTANT

## 2019-12-12 PROCEDURE — 99213 OFFICE O/P EST LOW 20 MIN: CPT | Performed by: PHYSICIAN ASSISTANT

## 2019-12-12 RX ADMIN — TRIAMCINOLONE ACETONIDE 40 MG: 40 INJECTION, SUSPENSION INTRA-ARTICULAR; INTRAMUSCULAR at 13:43

## 2019-12-12 RX ADMIN — LIDOCAINE HYDROCHLORIDE 4 ML: 10 INJECTION, SOLUTION EPIDURAL; INFILTRATION; INTRACAUDAL; PERINEURAL at 13:43

## 2019-12-12 NOTE — PROGRESS NOTES
"    [facility] Orthopa[facility] Surgery Clinic Note    Subjective     CC: Follow-up of the Left Shoulder (1 week follow up; Primary osteoarthritis of both shoulders //); Follow-up of the Right Shoulder (1 week follow up; Primary osteoarthritis of both shoulders ); Follow-up of the Right Knee (1 week follow up; right knee pain); and Pain of the Left Knee      HPI    Anita Cook is a 89 y.o. female.  Patient returns today to begin Visco supplementation to her right knee as well as bilateral corticosteroid injections to the shoulders.  However at this time her left shoulder is not bothering her significantly so she declines corticosteroid injection left shoulder but is requesting Visco supplementation to bilateral knees with a known history of osteoarthritis.    New today, patient reports that yesterday she did fall onto her right shoulder.  Currently she notes pain to the proximal humerus.  It is improving.  Will check x-ray to rule out fracture prior to administering corticosteroid injection.      ROS:    Constiutional:Pt denies fever, chills, nausea, or vomiting.  MSK:as above    Objective      Past Medical History  Past Medical History:   Diagnosis Date   • Arthritis    • Breast cancer (CMS/HCC)    • Cataracts, bilateral    • Fractures    • Frequent UTI    • GERD (gastroesophageal reflux disease)    • Glaucoma    • Hypertension    • Low bone density    • Stroke (cerebrum) (CMS/HCC)          Physical Exam  Pulse 86   Ht 152.4 cm (60\")   Wt 49.9 kg (110 lb 0.2 oz)   SpO2 96%   BMI 21.48 kg/m²     Body mass index is 21.48 kg/m².    Patient is well nourished and well developed.        Ortho Exam  Right Shoulder  Skin: Intact without any erythema, warmth or swelling.    Tenderness: R--patient has global tenderness in the shoulder to include ACJ but she also notes tenderness proximal humerus.  Motion: R--limited secondary to severe end-stage OA.    No provacative testing secondary to pain  Motor/sensory: Grossly intact " C5-T1.    Bilateral Knee  Alignment: R valgus, L mild varus  No change in exam when compared to 10/17/2019      Imaging/Labs/EMG Reviewed:  Ordered right shoulder plain films.  Imaging read by Dr. Leyva.    Indication: Right shoulder pain     Comparison: Todays xrays were compared to previous xrays from 10/17/2019     IMPRESSION:      Right shoulder 3 views: Radiographs demonstrate severe end-stage arthritis of the glenohumeral joint with rotator cuff arthropathy and superior migration of the humeral head relative to the glenoid.  No significant changes compared to prior radiographs.  No acute bony injury or fracture.      Assessment:  1. Primary osteoarthritis of both knees    2. Chronic pain of both knees    3. Primary osteoarthritis of both shoulders    4. Chronic pain of both shoulders        Plan:  1. Bilateral knee osteoarthritis--proceeded with first Visco supplementation injection to both knees today.  2. Bilateral shoulder osteoarthritis--left shoulder stable, right shoulder symptomatic.  Proceed with corticosteroid injection to right shoulder today.  3. Based on the patient's history and chronic pain we will also refer patient to pain management for evaluation of shoulder and knee blocks to help assist with pain control.  4. Follow-up next week for second Visco supplementation injections to bilateral knees.  5. Questions and concerns answered.    After discussing the risks, benefits, indications of injection, the patient gave consent to proceed.  Right shoulder was confirmed as the correct joint to be injected with a timeout.  It was then prepped using Hibiclens and injected with a mixture of 4 cc of 1% plain lidocaine and 1 cc of Kenalog (40 mg per mL), without any resistance through the posterior approach, patient in seated position.  Area was cleaned, hemostasis was achieved and a Band-Aid was applied over the injection site.  The patient tolerated procedure well.  I instructed the patient on signs  and symptoms of infection.  They should report to the emergency department or return to clinic if any of these develop, for further evaluation and treatment.  Recommended modifying activity for the next 48 hours to include rest, ice, elevation and oral pain medication as needed.      After discussing the risks, benefits, indications of injection, the patient gave consent to proceed.  Bilateral knees were confirmed as the correct joints to be injected with a timeout.  They were then prepped using Hibiclens and each injected with a prefilled syringe Orthovisc, without any resistance through the anterior lateral approach (left knee) and medial lateral approach (right knee), patient in seated position.  Area was cleaned, hemostasis was achieved and a Band-Aid was applied over the injection site.  The patient tolerated procedure well.  I instructed the patient on signs and symptoms of infection.  They should report to the emergency department or return to clinic if any of these develop, for further evaluation and treatment.  Recommended modifying activity for the next 48 hours to include rest, ice, elevation and oral pain medication as needed.        Cecy Camacho PA-C  12/16/19  9:10 AM

## 2019-12-12 NOTE — PROGRESS NOTES
Procedure   Large Joint Arthrocentesis: R knee  Date/Time: 12/12/2019 1:45 PM  Consent given by: patient  Site marked: site marked  Timeout: Immediately prior to procedure a time out was called to verify the correct patient, procedure, equipment, support staff and site/side marked as required   Supporting Documentation  Indications: pain   Procedure Details  Location: knee - R knee  Preparation: Patient was prepped and draped in the usual sterile fashion  Needle size: 22 G  Approach: medial  Medications administered: 30 mg Hyaluronan 30 MG/2ML  Aspirate: clear (to verify intraarticular placement of the needle)  Patient tolerance: patient tolerated the procedure well with no immediate complications    Large Joint Arthrocentesis: L knee  Date/Time: 12/12/2019 1:45 PM  Consent given by: patient  Site marked: site marked  Timeout: Immediately prior to procedure a time out was called to verify the correct patient, procedure, equipment, support staff and site/side marked as required   Supporting Documentation  Indications: pain   Procedure Details  Location: knee - L knee  Preparation: Patient was prepped and draped in the usual sterile fashion  Needle size: 22 G  Approach: anterolateral  Medications administered: 30 mg Hyaluronan 30 MG/2ML  Aspirate: clear (to verify intraarticular placement of the needle)  Patient tolerance: patient tolerated the procedure well with no immediate complications

## 2019-12-12 NOTE — PROGRESS NOTES
Procedure   Large Joint Arthrocentesis: R subacromial bursa  Date/Time: 12/12/2019 1:43 PM  Consent given by: patient  Site marked: site marked  Timeout: Immediately prior to procedure a time out was called to verify the correct patient, procedure, equipment, support staff and site/side marked as required   Supporting Documentation  Indications: pain   Procedure Details  Location: shoulder - R subacromial bursa  Preparation: Patient was prepped and draped in the usual sterile fashion  Needle size: 22 G  Approach: posterior  Medications administered: 40 mg triamcinolone acetonide 40 MG/ML; 4 mL lidocaine PF 1% 1 %  Patient tolerance: patient tolerated the procedure well with no immediate complications

## 2019-12-16 RX ORDER — TRIAMCINOLONE ACETONIDE 40 MG/ML
40 INJECTION, SUSPENSION INTRA-ARTICULAR; INTRAMUSCULAR
Status: COMPLETED | OUTPATIENT
Start: 2019-12-12 | End: 2019-12-12

## 2019-12-16 RX ORDER — LIDOCAINE HYDROCHLORIDE 10 MG/ML
4 INJECTION, SOLUTION EPIDURAL; INFILTRATION; INTRACAUDAL; PERINEURAL
Status: COMPLETED | OUTPATIENT
Start: 2019-12-12 | End: 2019-12-12

## 2019-12-19 ENCOUNTER — CLINICAL SUPPORT (OUTPATIENT)
Dept: ORTHOPEDIC SURGERY | Facility: CLINIC | Age: 84
End: 2019-12-19

## 2019-12-19 DIAGNOSIS — M17.0 PRIMARY OSTEOARTHRITIS OF BOTH KNEES: Primary | ICD-10-CM

## 2019-12-19 PROCEDURE — 20610 DRAIN/INJ JOINT/BURSA W/O US: CPT | Performed by: PHYSICIAN ASSISTANT

## 2019-12-19 NOTE — PROGRESS NOTES
Procedure   Large Joint Arthrocentesis: R knee  Date/Time: 12/19/2019 1:57 PM  Consent given by: patient  Site marked: site marked  Timeout: Immediately prior to procedure a time out was called to verify the correct patient, procedure, equipment, support staff and site/side marked as required   Supporting Documentation  Indications: pain   Procedure Details  Location: knee - R knee  Preparation: Patient was prepped and draped in the usual sterile fashion  Needle size: 22 G  Approach: anteromedial  Medications administered: 30 mg Hyaluronan 30 MG/2ML  Patient tolerance: patient tolerated the procedure well with no immediate complications    Large Joint Arthrocentesis: L knee  Date/Time: 12/19/2019 1:57 PM  Consent given by: patient  Site marked: site marked  Timeout: Immediately prior to procedure a time out was called to verify the correct patient, procedure, equipment, support staff and site/side marked as required   Supporting Documentation  Indications: pain   Procedure Details  Location: knee - L knee  Preparation: Patient was prepped and draped in the usual sterile fashion  Needle size: 22 G  Approach: anterolateral  Medications administered: 30 mg Hyaluronan 30 MG/2ML  Patient tolerance: patient tolerated the procedure well with no immediate complications

## 2019-12-20 NOTE — PROGRESS NOTES
CC: Follow-up bilateral knee osteoarthritis, 2/3 Orthovisc injection today    History of present illness: Patient presents for her second Orthovisc injection to the bilateral knees today.  At this time she denies any numbness or tingling into the distal extremity.  No fever, chills, night sweats or other constitutional symptoms.    No improvement following the first injection to knees.  Reports some improvement following right shoulder injection.    See chart for PMH, PSH, Meds, All - reviewed.    Ortho exam:  Bilateral knee  Skin is intact without redness, warmth or swelling/effusion.  No lesions or evidence of infection noted.  Motor/sensory: Grossly intact L2-S1.    Assessment/plan:  Bilateral knee osteoarthritis    Proceed today with 2/3 of Orthovisc injection.  Patient will follow-up in week for third injection.      After discussing the risks, benefits, indications of injection, the patient gave consent to proceed.  Bilateral knees were confirmed as the correct joints to be injected with a timeout.  They were then prepped using Hibiclens and each injected with a prefilled syringe Orthovisc, without any resistance through the anterior lateral approach (left knee) and medial lateral approach (right knee), patient in seated position.  Area was cleaned, hemostasis was achieved and a Band-Aid was applied over the injection site.  The patient tolerated procedure well.  I instructed the patient on signs and symptoms of infection.  They should report to the emergency department or return to clinic if any of these develop, for further evaluation and treatment.  Recommended modifying activity for the next 48 hours to include rest, ice, elevation and oral pain medication as needed.

## 2019-12-26 ENCOUNTER — CLINICAL SUPPORT (OUTPATIENT)
Dept: ORTHOPEDIC SURGERY | Facility: CLINIC | Age: 84
End: 2019-12-26

## 2019-12-26 DIAGNOSIS — M17.0 PRIMARY OSTEOARTHRITIS OF BOTH KNEES: Primary | ICD-10-CM

## 2019-12-26 PROCEDURE — 20610 DRAIN/INJ JOINT/BURSA W/O US: CPT | Performed by: PHYSICIAN ASSISTANT

## 2019-12-26 NOTE — PROGRESS NOTES
CC: Follow-up bilateral knee osteoarthritis, 3/3 Orthovisc injection today     History of present illness: Patient presents for her third Orthovisc injection to the bilateral knees today.  At this time she denies any numbness or tingling into the distal extremity.  No fever, chills, night sweats or other constitutional symptoms.     No improvement following the first injection to knees.  Reports some improvement following right shoulder injection.     See chart for PMH, PSH, Meds, All - reviewed.     Ortho exam:  Bilateral knee  Skin is intact without redness, warmth or swelling/effusion.  No lesions or evidence of infection noted.  Motor/sensory: Grossly intact L2-S1.     Assessment/plan:  Bilateral knee primary osteoarthritis     Proceed today with 3/3 of Orthovisc injection.  Patient will follow-up in 3 months for repeat evaluation, sooner if issues arise or symptoms worsen/change.  At that time consider repeat corticosteroid injection.  Patient is still waiting to be seen by Dr. Alonzo or Pain Management for possible geniculate blocks versus RFA.      After discussing the risks, benefits, indications of injection, the patient gave consent to proceed.  Bilateral knees were confirmed as the correct joints to be injected with a timeout.  They were then prepped using Hibiclens and each injected with a prefilled syringe Orthovisc, without any resistance through the anterior lateral approach (left knee) and medial lateral approach (right knee), patient in seated position.  Area was cleaned, hemostasis was achieved and a Band-Aid was applied over the injection site.  The patient tolerated procedure well.  I instructed the patient on signs and symptoms of infection.  They should report to the emergency department or return to clinic if any of these develop, for further evaluation and treatment.  Recommended modifying activity for the next 48 hours to include rest, ice, elevation and oral pain medication as needed.

## 2019-12-26 NOTE — PROGRESS NOTES
Procedure   Large Joint Arthrocentesis: R knee  Date/Time: 12/26/2019 12:59 PM  Consent given by: patient  Site marked: site marked  Timeout: Immediately prior to procedure a time out was called to verify the correct patient, procedure, equipment, support staff and site/side marked as required   Supporting Documentation  Indications: pain   Procedure Details  Location: knee - R knee  Preparation: Patient was prepped and draped in the usual sterile fashion  Needle size: 22 G  Approach: anterolateral  Medications administered: 30 mg Hyaluronan 30 MG/2ML  Patient tolerance: patient tolerated the procedure well with no immediate complications    Large Joint Arthrocentesis: L knee  Date/Time: 12/26/2019 12:59 PM  Consent given by: patient  Site marked: site marked  Timeout: Immediately prior to procedure a time out was called to verify the correct patient, procedure, equipment, support staff and site/side marked as required   Supporting Documentation  Indications: pain   Procedure Details  Location: knee - L knee  Preparation: Patient was prepped and draped in the usual sterile fashion  Needle size: 22 G  Approach: anterolateral  Medications administered: 30 mg Hyaluronan 30 MG/2ML  Patient tolerance: patient tolerated the procedure well with no immediate complications

## 2020-01-02 ENCOUNTER — TELEPHONE (OUTPATIENT)
Dept: PAIN MEDICINE | Facility: CLINIC | Age: 85
End: 2020-01-02

## 2020-01-14 ENCOUNTER — TELEPHONE (OUTPATIENT)
Dept: INTERNAL MEDICINE | Facility: CLINIC | Age: 85
End: 2020-01-14

## 2020-01-14 NOTE — TELEPHONE ENCOUNTER
Please call [facility] and see if they see patients for osteoarthritis.  Please let them know that she is received knee injections and plans to see pain management.  If they feel they can be of some assistance I would be glad to send him a referral however I am unsure that this will be helpful to her.  I will let the daughter know their response once I received it.

## 2020-01-14 NOTE — TELEPHONE ENCOUNTER
----- Message from Sherri Weiner CMA sent at 1/14/2020  1:17 PM EST -----  Regarding: FW: Referral Request  Contact: [phone]      ----- Message -----  From: Anita Cook  Sent: 1/14/2020  11:37 AM EST  To: Marilin Piña [facility]  Subject: Referral Request                                 Would you please put in a referral for Mom to the [facility]? They only take new patients from referrals. Mom has been seeing Cecy Camacho in  orthopedics for knee injections. They aren't effective, so we are expanding our search for help with that condition. She has an appointment coming up with the pain management doctor at . However, he is not arthritis specific and I thought another opinion/option might be good to check out. I was hoping to get started on this now rather than waiting for her next appointment with you in early February.     Thank you for your help!    Chaparrita Pepper (daughter of Anita Cook)

## 2020-01-15 NOTE — TELEPHONE ENCOUNTER
Attempted to reach [facility] at [phone] keeps ringing. Will try later

## 2020-01-16 NOTE — TELEPHONE ENCOUNTER
Spoke with  at [facility], she states they need to fax a specific form. I LVM for Herb the pt coordinator and provided provider comments as well as our fax number.

## 2020-01-16 NOTE — TELEPHONE ENCOUNTER
Attempted to call [facility] with  for someone to call back regarding a question about a patient's condition. Provided office number.

## 2020-01-16 NOTE — PROGRESS NOTES
"Chief Complaint: \"Pain in my knees.\"        History of Present Illness:   Patient: Ms. Anita Cook, 89 y.o. female   Referring Physician: Cecy Camacho PA-C   Reason for Referral: Consultation for chronic intractable bilateral knee pain.   Pain History: Patient reports a several year history of pain, which began without incident. Pain has progressed in intensity over the past  few months.   Pain Description: Constant pain with intermittent exacerbation, described as aching, dull, popping, and stiff sensation.   Radiation of Pain: The bilateral knee pain does not radiate. She reports pain in her right shin that only occurs during standing or walking. She uses a walker for ambulation. She reports elements suggesting LSS with claudication  Pain intensity today: 4/10  Average pain intensity last week: 5/10  Pain intensity ranges from: 1/10 to 6/10  Aggravating factors: Pain increases with standing longer than 2 minutes, ambulating more than 50 feet, and climbing steps  Alleviating factors: Pain decreases with sitting, lying down, and analgesics.      Associated Symptoms:   Patient denies numbness or weakness in the lower extremities.   Patient denies any new bladder or bowel problems.   Patient reports difficulties with her balance but denies recent falls     Review of previous therapies and additional medical records:  Anita Cook has already failed the following measures, including:   Conservative Measures: Oral analgesics, topical analgesics, ice and heat, physical therapy  Interventional Measures: Patient underwent several bilateral knee injections with Cecy Camacho PA-C, with the last procedure on 12/26/2019.  Surgical Measures: No previous history of knee surgery  Anita Cook underwent surgical consultation with Cecy Camacho PA-C on 12/12/2019, and was found not to be a surgical candidate.  Anita Cook presents with significant comorbidities including " hypertension, osteoarthritis, GERD, glaucoma, osteopenia, history of stroke in 2018, history of breast cancer 11 years ago; engaged in treatment.  In terms of current analgesics, Anita Cook takes: Voltaren gel, Advil, Tylenol  I have reviewed Yogesh Report #[id number] consistent to medication reconciliation.       Global Pain Scale 01-21  2020                 Pain  10                 Feelings  1                 Clinical outcomes  3                 Activities  14                 GPS Total:  28                    Review of Diagnostic Studies:  X-Ray Bilateral Knee on 10/17/2019: I have reviewed the images with the patient and her family. Right Knee: moderate to severe tricompartmental arthritis with genu valgum alignment, periarticular osteophytes visualized in all compartments. Left Knee: mild tricompartmental osteoarthritis.     Review of Systems   Musculoskeletal: Positive for arthralgias, gait problem, joint swelling, myalgias and neck pain.   All other systems reviewed and are negative.        Patient Active Problem List   Diagnosis   • Gastroesophageal reflux disease   • Cataract cortical, senile, bilateral   • Frequent UTI   • History of breast cancer   • Fall   • Essential hypertension   • Osteopenia   • Cerebrovascular accident (CVA) (CMS/HCC)   • Macular degeneration of both eyes   • Glaucoma of right eye   • Chronic pain in right shoulder   • Chronic pain of both knees   • Bilateral primary osteoarthritis of knee   • Gait disturbance   • Lumbar stenosis with neurogenic claudication   • At high risk for falls   • Impaired functional mobility, balance, and endurance       Past Medical History:   Diagnosis Date   • Arthritis    • Breast cancer (CMS/HCC)    • Cataracts, bilateral    • Fractures    • Frequent UTI    • GERD (gastroesophageal reflux disease)    • Glaucoma    • Hypertension    • Low bone density    • Stroke (cerebrum) (CMS/HCC)          Past Surgical History:   Procedure Laterality Date   •  "MASTECTOMY     • MULTIPLE TOOTH EXTRACTIONS     • THYROIDECTOMY, PARTIAL     • TONSILLECTOMY           Family History   Problem Relation Age of Onset   • Hypertension Mother    • Stroke Mother    • Arthritis Father    • Stroke Maternal Aunt          Social History     Socioeconomic History   • Marital status:      Spouse name: Not on file   • Number of children: Not on file   • Years of education: Not on file   • Highest education level: Not on file   Tobacco Use   • Smoking status: Never Smoker   • Smokeless tobacco: Never Used   Substance and Sexual Activity   • Alcohol use: No     Frequency: Never   • Drug use: No           Current Outpatient Medications:   •  acetaminophen (TYLENOL) 500 MG tablet, Take 500 mg by mouth Every 6 (Six) Hours As Needed for Mild Pain ., Disp: , Rfl:   •  amLODIPine (NORVASC) 10 MG tablet, Take 1 tablet by mouth Daily., Disp: 30 tablet, Rfl: 5  •  aspirin 81 MG chewable tablet, Chew 81 mg Daily., Disp: , Rfl:   •  benazepril (LOTENSIN) 40 MG tablet, Take 1 tablet by mouth Daily., Disp: 30 tablet, Rfl: 5  •  Calcium-Phosphorus-Vitamin D (CITRACAL +D3 PO), Take 400 mg by mouth 4 (Four) Times a Day., Disp: , Rfl:   •  cephalexin (KEFLEX) 250 MG capsule, Take 1 capsule by mouth Daily., Disp: 30 capsule, Rfl: 11  •  ibuprofen (ADVIL,MOTRIN) 200 MG tablet, Take 200 mg by mouth Every 6 (Six) Hours As Needed for Mild Pain ., Disp: , Rfl:   •  metoprolol succinate XL (TOPROL-XL) 50 MG 24 hr tablet, , Disp: , Rfl:   •  Multiple Vitamins-Minerals (CENTRUM SILVER PO), Take  by mouth., Disp: , Rfl:   •  Multiple Vitamins-Minerals (PRESERVISION AREDS PO), Take  by mouth., Disp: , Rfl:   •  pantoprazole (PROTONIX) 40 MG EC tablet, Take 1 tablet by mouth Daily., Disp: 90 tablet, Rfl: 3      Allergies   Allergen Reactions   • Bactrim [Sulfamethoxazole-Trimethoprim] Rash         /84 (BP Location: Left arm, Patient Position: Sitting)   Ht 152.4 cm (60\")   Wt 49.9 kg (110 lb)   BMI 21.48 " kg/m²       Physical Exam:  Constitutional: Patient is oriented to person, place, and time.   Patient appears well-developed and well-nourished.   HEENT: Head: Normocephalic and atraumatic.   Eyes: Conjunctivae and lids are normal.   Pupils: Equal, round, reactive to light.   Neck: Trachea normal. Neck supple. No JVD present.   Pulmonary Respiratory effort: No increased work of breathing or signs of respiratory distress. Auscultation of lungs: Clear to auscultation.   Cardiovascular Auscultation of heart: Normal rate and rhythm, normal S1 and S2, no murmurs.   Peripheral vascular exam: Femoral: right 2+, left 2+. Posterior tibialis: right 2+ and left 2+. Dorsalis pedis: right 2+ and left 2+. No edema. Musculoskeletal   Gait and station: Gait evaluation demonstrated shuffling. She is able to walk for short distances with a walker. She leans forward.  Lumbar spine: Passive and active range of motion are limited but without pain. Extension, flexion, lateral flexion, rotation of the lumbar spine did not increase or reproduce pain. Lumbar facet joint loading maneuvers are negative.   Maximus test and Gaenslen's test are negative   Palpation of the bilateral greater trochanter, unrevealing   Examination of the Iliotibial band: unrevealing   Hip joints: The range of motion of the hip joints is limited but without pain   Right knee: Gross right valgus deformity of the knee joint. -10 extension, 100 degrees of flexion. Mild laxity. No tenderness found. No MCL and no LCL tenderness noted. Crepitus.  Left knee: Range of motion 0-110. No ACL, PCL, LCL or MCL laxity. No tenderness found. No MCL and no LCL tenderness noted. Crepitus.   Neurological:   Patient is alert and oriented to person, place, and time.   Speech: Speech is normal.   Cortical function: Normal mental status.   Cranial nerves: Cranial nerves 2-12 intact.   Reflex Scores:  Right patellar: 1+  Left patellar: 1+  Right Achilles: 1+  Left Achilles: 1+  Motor  strength: 5/5  Motor Tone: normal tone.   Involuntary movements: none.   Superficial/Primitive Reflexes: primitive reflexes were absent.   Right Spencer: absent  Left Spencer: absent  Right ankle clonus: absent  Left ankle clonus: absent   Babinsky: absent  Negative long tract signs. Straight leg raising test is negative. Femoral stretch sign is negative.   Sensation: No sensory loss. Sensory exam: intact to light touch, intact pain and temperature sensation, intact vibration sensation and normal proprioception.   Coordination: Normal finger to nose and heel to shin. Abnormal balance. Negative Romberg's sign   Skin and subcutaneous tissue: Skin is warm and intact. No rash noted. No cyanosis.   Psychiatric: Judgment and insight: Normal. Orientation to person, place and time: Normal. Recent and remote memory: Intact. Mood and affect: Normal.     ASSESSMENT:   1. Bilateral primary osteoarthritis of knee    2. Lumbar stenosis with neurogenic claudication    3. Chronic pain in right shoulder    4. Osteopenia, unspecified location    5. History of breast cancer    6. Gait disturbance    7. At high risk for falls    8. Impaired functional mobility, balance, and endurance        PLAN/MEDICAL DECISION MAKING: I had a lengthy conversation with Ms. Anita Valentine Joyce regarding her chronic pain condition and potential therapeutic options including risks, benefits, alternative therapies, to name a few. Patient has failed to obtain pain relief with conservative measures and previous interventional pain management measures, as referenced above. Patient reports severe bilateral knee pain and some right leg pain with intolerance to standing and walking. I have reviewed all available patient's medical records as well as previous therapies as referenced above.  Therefore, I have proposed the following plan:  1. Interventional pain management measures: Patient will be scheduled for diagnostic bilateral superior medial genicular nerve  block, bilateral superior lateral genicular nerve block and bilateral inferior medial genicular nerve block. If patient experiences more than 50% pain relief along with significant improvement in the range of motion of the knee joint, then, patient will be scheduled for bipolar radiofrequency ablation of the bilateral superomedial, superolateral and inferomedial genicular nerves. Otherwise, we will obtain an MRI of the lumbar spine w/o contrast. Depending on MRI findings, we may contemplate diagnostic and therapeutic bilateral lumbar transforaminal epidural steroid injections. Other options would include a spinal cord stimulator trial or PNS trial   2. Diagnostic studies: I have discussed the possibility of an MRI of the lumbar spine without contrast  3. Pharmacological measures: Reviewed. Discussed.   A. Patient takes Voltaren gel, Advil, Tylenol  B. Trial with Rheumate one tablet twice daily  C. Trial with prilocaine 2%, lidocaine 10%, capsaicin 0.001% and mannitol 20%  cream, apply 1 to 2 grams of cream to the affected areas every 4 to 6 hours as needed  4. Long-term rehabilitation efforts  A. The patient does not havea history of falls. I did complete a risk assessment for falls. Fall precautions: Patient has been instructed regarding universal fall precautions, such as;   · Using gait aids a rolling walker at the appropriate height at all times for ambulation or wheelchair  · Removing all area rugs and coffee tables to create a safe environment at home  · Ensure clean, dry floors  · Wearing supportive footwear and properly fitting clothing  · Ensure bed/chair is appropriate height and patient's feet can touch the floor  · Using a shower transfer bench  · Using walk-in shower and having shower safety bars installed  · Ensure proper lighting, minimize glare  · Have nightlights operational and in use  · Participation in an exercise program for gait training, balance training and strength  · Ensure proper  compliance and organization of medications to avoid errors   · Avoid use of over the counter sedatives and alcohol consumption  · Ensure easy access to call bell, glasses, TV control, telephone  · Ensure glasses/hearing aids are in use or close by (on top of night table)  B.  Patient will start a comprehensive physical therapy program at [facility] for water therapy, core strengthening, gait and balance training and dry needling once pain is under control  C.  Braces: I have discussed the possibility of a custom made brace for for stabilization of the medial and lateral compartments. Will check this with [facility] bracing. Patient has been prescribed a knee brace due to end-stage osteoarthritis of the right knee due to ligamentous instability. The brace will help support the knee and limit instability.  D. Contrast therapy: Apply ice-packs for 15-20 minutes, followed by heating pads for 15-20 minutes to affected area   5. The patient and her family have been instructed to contact my office with any questions or difficulties. The patient understands the plan and agrees to proceed accordingly.           Patient Care Team:  Shae Del Toro APRN as PCP - General (Internal Medicine)  Cecy Camacho PA-C as Physician Assistant (Physician Assistant)     No orders of the defined types were placed in this encounter.        Future Appointments   Date Time Provider Department Center   2/4/2020  7:30 AM Shae Del Toro APRN [facility] None   3/26/2020  1:40 PM Cecy Camacho PA-C [facility] None         Rambo Alonzo MD     EMR Dragon/Transcription disclaimer:  Much of this encounter note is an electronic transcription of spoken language to printed text. Electronic transcription of spoken language may permit erroneous, or at times, nonsensical words or phrases to be inadvertently transcribed. Although I have reviewed the note for such errors, some may still exist.

## 2020-01-17 PROBLEM — R26.9 GAIT DISTURBANCE: Status: ACTIVE | Noted: 2020-01-17

## 2020-01-17 PROBLEM — M17.0 BILATERAL PRIMARY OSTEOARTHRITIS OF KNEE: Status: ACTIVE | Noted: 2020-01-17

## 2020-01-21 ENCOUNTER — OFFICE VISIT (OUTPATIENT)
Dept: PAIN MEDICINE | Facility: CLINIC | Age: 85
End: 2020-01-21

## 2020-01-21 VITALS
BODY MASS INDEX: 21.6 KG/M2 | DIASTOLIC BLOOD PRESSURE: 84 MMHG | SYSTOLIC BLOOD PRESSURE: 142 MMHG | HEIGHT: 60 IN | WEIGHT: 110 LBS

## 2020-01-21 DIAGNOSIS — Z74.09 IMPAIRED FUNCTIONAL MOBILITY, BALANCE, AND ENDURANCE: ICD-10-CM

## 2020-01-21 DIAGNOSIS — G89.29 CHRONIC PAIN IN RIGHT SHOULDER: ICD-10-CM

## 2020-01-21 DIAGNOSIS — M17.0 BILATERAL PRIMARY OSTEOARTHRITIS OF KNEE: ICD-10-CM

## 2020-01-21 DIAGNOSIS — M85.80 OSTEOPENIA, UNSPECIFIED LOCATION: ICD-10-CM

## 2020-01-21 DIAGNOSIS — Z91.81 AT HIGH RISK FOR FALLS: ICD-10-CM

## 2020-01-21 DIAGNOSIS — Z85.3 HISTORY OF BREAST CANCER: ICD-10-CM

## 2020-01-21 DIAGNOSIS — M17.0 BILATERAL PRIMARY OSTEOARTHRITIS OF KNEE: Primary | ICD-10-CM

## 2020-01-21 DIAGNOSIS — R26.9 GAIT DISTURBANCE: ICD-10-CM

## 2020-01-21 DIAGNOSIS — M48.062 LUMBAR STENOSIS WITH NEUROGENIC CLAUDICATION: ICD-10-CM

## 2020-01-21 DIAGNOSIS — M25.511 CHRONIC PAIN IN RIGHT SHOULDER: ICD-10-CM

## 2020-01-21 PROCEDURE — 99204 OFFICE O/P NEW MOD 45 MIN: CPT | Performed by: ANESTHESIOLOGY

## 2020-01-21 RX ORDER — ME-TETRAHYDROFOLATE/B12/HRB236 1-1-500 MG
CAPSULE ORAL
Qty: 180 CAPSULE | Refills: 1 | Status: SHIPPED | OUTPATIENT
Start: 2020-01-21 | End: 2020-09-24

## 2020-01-29 ENCOUNTER — OUTSIDE FACILITY SERVICE (OUTPATIENT)
Dept: PAIN MEDICINE | Facility: CLINIC | Age: 85
End: 2020-01-29

## 2020-01-29 DIAGNOSIS — M17.0 BILATERAL PRIMARY OSTEOARTHRITIS OF KNEE: Primary | ICD-10-CM

## 2020-01-29 PROCEDURE — 99152 MOD SED SAME PHYS/QHP 5/>YRS: CPT | Performed by: ANESTHESIOLOGY

## 2020-01-29 PROCEDURE — 64454 NJX AA&/STRD GNCLR NRV BRNCH: CPT | Performed by: ANESTHESIOLOGY

## 2020-01-30 ENCOUNTER — TELEPHONE (OUTPATIENT)
Dept: PAIN MEDICINE | Facility: CLINIC | Age: 85
End: 2020-01-30

## 2020-01-30 NOTE — TELEPHONE ENCOUNTER
LVM with appointment times for right and left genicular RFTC's. Advised to call and set up physical therapy to start after left knee completed.

## 2020-02-04 ENCOUNTER — OFFICE VISIT (OUTPATIENT)
Dept: INTERNAL MEDICINE | Facility: CLINIC | Age: 85
End: 2020-02-04

## 2020-02-04 VITALS
OXYGEN SATURATION: 98 % | RESPIRATION RATE: 18 BRPM | WEIGHT: 110.38 LBS | HEART RATE: 81 BPM | BODY MASS INDEX: 21.56 KG/M2 | DIASTOLIC BLOOD PRESSURE: 62 MMHG | SYSTOLIC BLOOD PRESSURE: 110 MMHG | TEMPERATURE: 98 F

## 2020-02-04 DIAGNOSIS — N39.0 FREQUENT UTI: ICD-10-CM

## 2020-02-04 DIAGNOSIS — K21.9 GASTROESOPHAGEAL REFLUX DISEASE, ESOPHAGITIS PRESENCE NOT SPECIFIED: ICD-10-CM

## 2020-02-04 DIAGNOSIS — R31.9 HEMATURIA, UNSPECIFIED TYPE: ICD-10-CM

## 2020-02-04 DIAGNOSIS — E78.1 HYPERTRIGLYCERIDEMIA: ICD-10-CM

## 2020-02-04 DIAGNOSIS — I10 ESSENTIAL HYPERTENSION: Primary | ICD-10-CM

## 2020-02-04 DIAGNOSIS — I49.3 SYMPTOMATIC PVCS: ICD-10-CM

## 2020-02-04 DIAGNOSIS — M85.80 OSTEOPENIA, UNSPECIFIED LOCATION: ICD-10-CM

## 2020-02-04 LAB
ALBUMIN SERPL-MCNC: 4.4 G/DL (ref 3.5–5.2)
ALBUMIN/GLOB SERPL: 1.4 G/DL
ALP SERPL-CCNC: 62 U/L (ref 39–117)
ALT SERPL W P-5'-P-CCNC: 11 U/L (ref 1–33)
ANION GAP SERPL CALCULATED.3IONS-SCNC: 15.1 MMOL/L (ref 5–15)
AST SERPL-CCNC: 16 U/L (ref 1–32)
BACTERIA UR QL AUTO: ABNORMAL /HPF
BASOPHILS # BLD AUTO: 0.03 10*3/MM3 (ref 0–0.2)
BASOPHILS NFR BLD AUTO: 0.4 % (ref 0–1.5)
BILIRUB BLD-MCNC: NEGATIVE MG/DL
BILIRUB SERPL-MCNC: 0.2 MG/DL (ref 0.2–1.2)
BUN BLD-MCNC: 17 MG/DL (ref 8–23)
BUN/CREAT SERPL: 24.3 (ref 7–25)
CALCIUM SPEC-SCNC: 9.8 MG/DL (ref 8.2–9.6)
CHLORIDE SERPL-SCNC: 96 MMOL/L (ref 98–107)
CHOLEST SERPL-MCNC: 183 MG/DL (ref 0–200)
CLARITY, POC: ABNORMAL
CO2 SERPL-SCNC: 25.9 MMOL/L (ref 22–29)
COLOR UR: YELLOW
CREAT BLD-MCNC: 0.7 MG/DL (ref 0.57–1)
DEPRECATED RDW RBC AUTO: 43.9 FL (ref 37–54)
EOSINOPHIL # BLD AUTO: 0.13 10*3/MM3 (ref 0–0.4)
EOSINOPHIL NFR BLD AUTO: 1.5 % (ref 0.3–6.2)
ERYTHROCYTE [DISTWIDTH] IN BLOOD BY AUTOMATED COUNT: 12.3 % (ref 12.3–15.4)
EXPIRATION DATE: ABNORMAL
GFR SERPL CREATININE-BSD FRML MDRD: 78 ML/MIN/1.73
GLOBULIN UR ELPH-MCNC: 3.1 GM/DL
GLUCOSE BLD-MCNC: 94 MG/DL (ref 65–99)
GLUCOSE UR STRIP-MCNC: NEGATIVE MG/DL
HCT VFR BLD AUTO: 37.2 % (ref 34–46.6)
HDLC SERPL-MCNC: 58 MG/DL (ref 40–60)
HGB BLD-MCNC: 13.2 G/DL (ref 12–15.9)
HYALINE CASTS UR QL AUTO: ABNORMAL /LPF
IMM GRANULOCYTES # BLD AUTO: 0.02 10*3/MM3 (ref 0–0.05)
IMM GRANULOCYTES NFR BLD AUTO: 0.2 % (ref 0–0.5)
KETONES UR QL: NEGATIVE
LDLC SERPL CALC-MCNC: 96 MG/DL (ref 0–100)
LDLC/HDLC SERPL: 1.65 {RATIO}
LEUKOCYTE EST, POC: NEGATIVE
LYMPHOCYTES # BLD AUTO: 0.82 10*3/MM3 (ref 0.7–3.1)
LYMPHOCYTES NFR BLD AUTO: 9.6 % (ref 19.6–45.3)
Lab: ABNORMAL
MCH RBC QN AUTO: 34.7 PG (ref 26.6–33)
MCHC RBC AUTO-ENTMCNC: 35.5 G/DL (ref 31.5–35.7)
MCV RBC AUTO: 97.9 FL (ref 79–97)
MONOCYTES # BLD AUTO: 0.7 10*3/MM3 (ref 0.1–0.9)
MONOCYTES NFR BLD AUTO: 8.2 % (ref 5–12)
NEUTROPHILS # BLD AUTO: 6.84 10*3/MM3 (ref 1.7–7)
NEUTROPHILS NFR BLD AUTO: 80.1 % (ref 42.7–76)
NITRITE UR-MCNC: NEGATIVE MG/ML
NRBC BLD AUTO-RTO: 0 /100 WBC (ref 0–0.2)
PH UR: 8 [PH] (ref 5–8)
PLATELET # BLD AUTO: 267 10*3/MM3 (ref 140–450)
PMV BLD AUTO: 9.8 FL (ref 6–12)
POTASSIUM BLD-SCNC: 3.6 MMOL/L (ref 3.5–5.2)
PROT SERPL-MCNC: 7.5 G/DL (ref 6–8.5)
PROT UR STRIP-MCNC: NEGATIVE MG/DL
RBC # BLD AUTO: 3.8 10*6/MM3 (ref 3.77–5.28)
RBC # UR STRIP: ABNORMAL /UL
RBC # UR: ABNORMAL /HPF
REF LAB TEST METHOD: ABNORMAL
SODIUM BLD-SCNC: 137 MMOL/L (ref 136–145)
SP GR UR: 1.01 (ref 1–1.03)
SQUAMOUS #/AREA URNS HPF: ABNORMAL /HPF
TRIGL SERPL-MCNC: 147 MG/DL (ref 0–150)
UROBILINOGEN UR QL: NORMAL
VLDLC SERPL-MCNC: 29.4 MG/DL (ref 5–40)
WBC NRBC COR # BLD: 8.54 10*3/MM3 (ref 3.4–10.8)
WBC UR QL AUTO: ABNORMAL /HPF

## 2020-02-04 PROCEDURE — 85025 COMPLETE CBC W/AUTO DIFF WBC: CPT | Performed by: NURSE PRACTITIONER

## 2020-02-04 PROCEDURE — 36415 COLL VENOUS BLD VENIPUNCTURE: CPT | Performed by: NURSE PRACTITIONER

## 2020-02-04 PROCEDURE — 80053 COMPREHEN METABOLIC PANEL: CPT | Performed by: NURSE PRACTITIONER

## 2020-02-04 PROCEDURE — 87086 URINE CULTURE/COLONY COUNT: CPT | Performed by: NURSE PRACTITIONER

## 2020-02-04 PROCEDURE — 81003 URINALYSIS AUTO W/O SCOPE: CPT | Performed by: NURSE PRACTITIONER

## 2020-02-04 PROCEDURE — 81015 MICROSCOPIC EXAM OF URINE: CPT | Performed by: NURSE PRACTITIONER

## 2020-02-04 PROCEDURE — 93000 ELECTROCARDIOGRAM COMPLETE: CPT | Performed by: NURSE PRACTITIONER

## 2020-02-04 PROCEDURE — 99214 OFFICE O/P EST MOD 30 MIN: CPT | Performed by: NURSE PRACTITIONER

## 2020-02-04 PROCEDURE — 80061 LIPID PANEL: CPT | Performed by: NURSE PRACTITIONER

## 2020-02-04 RX ORDER — BENAZEPRIL HYDROCHLORIDE 40 MG/1
40 TABLET, FILM COATED ORAL DAILY
Qty: 90 TABLET | Refills: 1 | Status: SHIPPED | OUTPATIENT
Start: 2020-02-04 | End: 2020-11-18 | Stop reason: SDUPTHER

## 2020-02-04 RX ORDER — AMLODIPINE BESYLATE 10 MG/1
10 TABLET ORAL DAILY
Qty: 90 TABLET | Refills: 1 | Status: SHIPPED | OUTPATIENT
Start: 2020-02-04 | End: 2020-11-07

## 2020-02-04 RX ORDER — FAMOTIDINE 40 MG/1
40 TABLET, FILM COATED ORAL DAILY
Qty: 90 TABLET | Refills: 1 | Status: SHIPPED | OUTPATIENT
Start: 2020-02-04 | End: 2020-04-30

## 2020-02-04 RX ORDER — PANTOPRAZOLE SODIUM 40 MG/1
40 TABLET, DELAYED RELEASE ORAL DAILY
Qty: 90 TABLET | Refills: 3 | Status: SHIPPED | OUTPATIENT
Start: 2020-02-04 | End: 2020-02-19

## 2020-02-04 RX ORDER — METOPROLOL SUCCINATE 50 MG/1
50 TABLET, EXTENDED RELEASE ORAL DAILY
Qty: 90 TABLET | Refills: 1 | Status: SHIPPED | OUTPATIENT
Start: 2020-02-04 | End: 2020-09-10

## 2020-02-04 NOTE — PROGRESS NOTES
Chief Complaint   Patient presents with   • Hypertension     4mth f/u / fasting/ a-fib and arthrosclerosis        Subjective     History of Present Illness   The patient is here today for follow up of chronic problems and she is with her daughter.  Patient has hypertension denies chest pain shortness of breath or swelling.    Patient has had a history of stroke with no stroke symptoms noted today.    Patient has GERD without exacerbation.    Patient has chronic knee pain.  She has a history of falls but no recent or additional fall since last visit.  She has seen PMR and going to have procedure upcoming on her knees. diclofenac cream is helpful.        She has had dermatology consult and had a precancerous lesion on face.   Obtain records from [facility]    She was supposed to see urology but the patient declined- she is taking low dose antibiotic- when she started with me and has no c/o's and wishes to stay on the antibiotic for prophylaxis treatment.    Seen Dr Nathan ophthalmology due to glaucoma B.  2/2020 also has had mild macular degeneration.     She has recently lost her .    She has osteopenia but is not on calcium at this time due to hypercalcemia noted on blood work.  She is not doing muscle strengthening at home.    Patient has history of Schatzki ring without exacerbation of symptoms.    Patient has had hematuria and declines to have evaluated with risk and benefits  reviewed in the past and reviewed again today.      [facility]- daughter states used to not always take med and the stroke was stopped related to uncontrolled blood pressure when she was not taking her medicines.  The patient is now living with her daughter and taking her medications consistently.  She does have any palpitations noted today.        The following portions of the patient's history were reviewed and updated as appropriate: allergies, current medications, past family history, past medical history, past social history, past  surgical history and problem list.      Current Outpatient Medications:   •  acetaminophen (TYLENOL) 500 MG tablet, Take 500 mg by mouth Every 6 (Six) Hours As Needed for Mild Pain ., Disp: , Rfl:   •  amLODIPine (NORVASC) 10 MG tablet, Take 1 tablet by mouth Daily., Disp: 90 tablet, Rfl: 1  •  aspirin 81 MG chewable tablet, Chew 81 mg Daily., Disp: , Rfl:   •  benazepril (LOTENSIN) 40 MG tablet, Take 1 tablet by mouth Daily., Disp: 90 tablet, Rfl: 1  •  diclofenac (VOLTAREN) 1 % gel gel, Apply 4 g topically to the appropriate area as directed 4 (Four) Times a Day., Disp: 100 g, Rfl: 12  •  Dietary Management Product (RHEUMATE) capsule, Take 1 capsule twice daily, Disp: 180 capsule, Rfl: 1  •  ibuprofen (ADVIL,MOTRIN) 200 MG tablet, Take 200 mg by mouth Every 6 (Six) Hours As Needed for Mild Pain ., Disp: , Rfl:   •  metoprolol succinate XL (TOPROL-XL) 50 MG 24 hr tablet, Take 1 tablet by mouth Daily., Disp: 90 tablet, Rfl: 1  •  Multiple Vitamins-Minerals (CENTRUM SILVER PO), Take  by mouth., Disp: , Rfl:   •  Multiple Vitamins-Minerals (PRESERVISION AREDS PO), Take  by mouth., Disp: , Rfl:   •  pantoprazole (PROTONIX) 40 MG EC tablet, Take 1 tablet by mouth Daily., Disp: 90 tablet, Rfl: 3  •  cephalexin (KEFLEX) 250 MG capsule, Take 1 capsule by mouth Daily., Disp: 30 capsule, Rfl: 11  •  famotidine (PEPCID) 40 MG tablet, Take 1 tablet by mouth Daily., Disp: 90 tablet, Rfl: 1    Vitals:    02/04/20 0748   BP: 110/62   Pulse: 81   Resp: 18   Temp: 98 °F (36.7 °C)   SpO2: 98%       Body mass index is 21.56 kg/m².        Review of Systems   Constitutional: Negative for activity change, appetite change, chills, fatigue and fever.   HENT: Negative for congestion, postnasal drip and sore throat.    Eyes: Negative for visual disturbance.   Respiratory: Negative for cough and shortness of breath.    Cardiovascular: Negative for chest pain, palpitations and leg swelling.   Gastrointestinal: Negative for abdominal pain,  constipation, diarrhea, nausea and GERD.   Musculoskeletal: Negative for arthralgias and myalgias.   Skin: Negative for rash.   Allergic/Immunologic: Negative for environmental allergies.   Neurological: Negative for dizziness.   Psychiatric/Behavioral: Negative for sleep disturbance.   All other systems reviewed and are negative.      Objective   Physical Exam   Constitutional: She is oriented to person, place, and time. She appears well-developed and well-nourished.   HENT:   Head: Normocephalic and atraumatic.   Neck: No thyromegaly present.   Cardiovascular: Normal rate, regular rhythm and intact distal pulses.   Pulmonary/Chest: Effort normal and breath sounds normal.   Abdominal: Soft. Bowel sounds are normal. She exhibits no distension. There is no tenderness.   Musculoskeletal: She exhibits no edema.   Lymphadenopathy:     She has no cervical adenopathy.   Neurological: She is alert and oriented to person, place, and time.   Skin: Capillary refill takes 2 to 3 seconds.   Psychiatric: She has a normal mood and affect. Her behavior is normal.   Nursing note and vitals reviewed.           Assessment/Plan   Anita was seen today for hypertension.    Diagnoses and all orders for this visit:    Essential hypertension  -     POC Urinalysis Dipstick, Automated  -     Comprehensive Metabolic Panel  -     ECG 12 Lead    Hematuria, unspecified type  -     POC Urinalysis Dipstick, Automated  -     CBC & Differential  -     CBC Auto Differential  -     Urinalysis, Microscopic Only - Urine, Clean Catch  -     Urine Culture - Urine, Urine, Clean Catch    Gastroesophageal reflux disease, esophagitis presence not specified  -     CBC & Differential  -     CBC Auto Differential    Hypertriglyceridemia  -     Comprehensive Metabolic Panel  -     Lipid Panel    Frequent UTI    Osteopenia, unspecified location    Symptomatic PVCs    Other orders  -     amLODIPine (NORVASC) 10 MG tablet; Take 1 tablet by mouth Daily.  -      benazepril (LOTENSIN) 40 MG tablet; Take 1 tablet by mouth Daily.  -     metoprolol succinate XL (TOPROL-XL) 50 MG 24 hr tablet; Take 1 tablet by mouth Daily.  -     pantoprazole (PROTONIX) 40 MG EC tablet; Take 1 tablet by mouth Daily.  -     famotidine (PEPCID) 40 MG tablet; Take 1 tablet by mouth Daily.      ECG 12 Lead  Date/Time: 2/4/2020 8:28 AM  Performed by: Shae Del Toro APRN  Authorized by: Shae Del Toro APRN   Comparison: not compared with previous ECG   Previous ECG: no previous ECG available  Rhythm: sinus rhythm  Ectopy: infrequent PVCs  Rate: normal  Conduction: conduction normal  ST Segments: ST segments normal  T Waves: T waves normal  QRS axis: normal    Clinical impression: non-specific ECG        Patient does have PVCs without dizziness presyncopal symptoms.  I have asked her to let us know if they worsen as they are only occasional at this time without other symptoms noted except palpitation.  She takes advil 1 with food daily.  I have cautioned to try to use Tylenol in which she does have to take Advil take minimal amounts with food.    Change from pantoprazole to pepcid - trial.  Urinalysis notes calcium oxalate on micro.  Possible kidney stones.  A phone call to the patient and family to see if they would like to consider CT of the abdomen and pelvis to evaluate further for stone/obstruction/hydronephrosis.  Return in about 6 months (around 8/4/2020) for fasting, Next scheduled follow up.  RTC/call  If symptoms worsen  Meds MOA and SE's reviewed and pt v/u

## 2020-02-05 LAB — BACTERIA SPEC AEROBE CULT: NO GROWTH

## 2020-02-09 PROBLEM — E83.52 HYPERCALCEMIA: Status: ACTIVE | Noted: 2020-02-09

## 2020-02-09 PROBLEM — R31.9 HEMATURIA: Status: ACTIVE | Noted: 2020-02-09

## 2020-02-12 ENCOUNTER — OUTSIDE FACILITY SERVICE (OUTPATIENT)
Dept: PAIN MEDICINE | Facility: CLINIC | Age: 85
End: 2020-02-12

## 2020-02-12 PROCEDURE — 64624 DSTRJ NULYT AGT GNCLR NRV: CPT | Performed by: ANESTHESIOLOGY

## 2020-02-12 PROCEDURE — 99152 MOD SED SAME PHYS/QHP 5/>YRS: CPT | Performed by: ANESTHESIOLOGY

## 2020-02-12 NOTE — TELEPHONE ENCOUNTER
Can alternate Tylenol with Motrin 200 mg tablet to every 8 hours with food.  This should be alternated with the Tylenol every 4-6 hours regular strength 1 to 2 tablets of the 325 mg tablets.  She is not doing better she should be evaluated in clinic Monday.  Of course for any reason she was worsening over the weekend I would want her seen in the emergency room for evaluation.   How Severe Is Your Rash?: moderate Is This A New Presentation, Or A Follow-Up?: Rash

## 2020-02-13 ENCOUNTER — TELEPHONE (OUTPATIENT)
Dept: INTERNAL MEDICINE | Facility: CLINIC | Age: 85
End: 2020-02-13

## 2020-02-13 ENCOUNTER — TELEPHONE (OUTPATIENT)
Dept: PAIN MEDICINE | Facility: CLINIC | Age: 85
End: 2020-02-13

## 2020-02-13 PROBLEM — H35.30 MACULAR DEGENERATION: Status: ACTIVE | Noted: 2020-02-13

## 2020-02-13 PROBLEM — H35.30 MACULAR DEGENERATION: Status: RESOLVED | Noted: 2020-02-13 | Resolved: 2020-02-13

## 2020-02-13 PROBLEM — K22.2 SCHATZKI'S RING: Status: ACTIVE | Noted: 2020-02-13

## 2020-02-13 NOTE — TELEPHONE ENCOUNTER
BIPOLAR RADIOFREQUENCY ABLATION OF RT GENICULAR NERVES 02/12/2020.    Spoke with patient daughter, Chaparrita and she advised that the patient is doing very well.  No questions/concerns.  Pt to f/u on 02/19.

## 2020-02-14 ENCOUNTER — TELEPHONE (OUTPATIENT)
Dept: INTERNAL MEDICINE | Facility: CLINIC | Age: 85
End: 2020-02-14

## 2020-02-14 NOTE — PATIENT INSTRUCTIONS
"DASH Eating Plan  DASH stands for \"Dietary Approaches to Stop Hypertension.\" The DASH eating plan is a healthy eating plan that has been shown to reduce high blood pressure (hypertension). It may also reduce your risk for type 2 diabetes, heart disease, and stroke. The DASH eating plan may also help with weight loss.  What are tips for following this plan?    General guidelines  · Avoid eating more than 2,300 mg (milligrams) of salt (sodium) a day. If you have hypertension, you may need to reduce your sodium intake to 1,500 mg a day.  · Limit alcohol intake to no more than 1 drink a day for nonpregnant women and 2 drinks a day for men. One drink equals 12 oz of beer, 5 oz of wine, or 1½ oz of hard liquor.  · Work with your health care provider to maintain a healthy body weight or to lose weight. Ask what an ideal weight is for you.  · Get at least 30 minutes of exercise that causes your heart to beat faster (aerobic exercise) most days of the week. Activities may include walking, swimming, or biking.  · Work with your health care provider or diet and nutrition specialist (dietitian) to adjust your eating plan to your individual calorie needs.  Reading food labels    · Check food labels for the amount of sodium per serving. Choose foods with less than 5 percent of the Daily Value of sodium. Generally, foods with less than 300 mg of sodium per serving fit into this eating plan.  · To find whole grains, look for the word \"whole\" as the first word in the ingredient list.  Shopping  · Buy products labeled as \"low-sodium\" or \"no salt added.\"  · Buy fresh foods. Avoid canned foods and premade or frozen meals.  Cooking  · Avoid adding salt when cooking. Use salt-free seasonings or herbs instead of table salt or sea salt. Check with your health care provider or pharmacist before using salt substitutes.  · Do not leblanc foods. Cook foods using healthy methods such as baking, boiling, grilling, and broiling instead.  · Cook with " heart-healthy oils, such as olive, canola, soybean, or sunflower oil.  Meal planning  · Eat a balanced diet that includes:  ? 5 or more servings of fruits and vegetables each day. At each meal, try to fill half of your plate with fruits and vegetables.  ? Up to 6-8 servings of whole grains each day.  ? Less than 6 oz of lean meat, poultry, or fish each day. A 3-oz serving of meat is about the same size as a deck of cards. One egg equals 1 oz.  ? 2 servings of low-fat dairy each day.  ? A serving of nuts, seeds, or beans 5 times each week.  ? Heart-healthy fats. Healthy fats called Omega-3 fatty acids are found in foods such as flaxseeds and coldwater fish, like sardines, salmon, and mackerel.  · Limit how much you eat of the following:  ? Canned or prepackaged foods.  ? Food that is high in trans fat, such as fried foods.  ? Food that is high in saturated fat, such as fatty meat.  ? Sweets, desserts, sugary drinks, and other foods with added sugar.  ? Full-fat dairy products.  · Do not salt foods before eating.  · Try to eat at least 2 vegetarian meals each week.  · Eat more home-cooked food and less restaurant, buffet, and fast food.  · When eating at a restaurant, ask that your food be prepared with less salt or no salt, if possible.  What foods are recommended?  The items listed may not be a complete list. Talk with your dietitian about what dietary choices are best for you.  Grains  Whole-grain or whole-wheat bread. Whole-grain or whole-wheat pasta. Brown rice. Oatmeal. Quinoa. Bulgur. Whole-grain and low-sodium cereals. Amalia bread. Low-fat, low-sodium crackers. Whole-wheat flour tortillas.  Vegetables  Fresh or frozen vegetables (raw, steamed, roasted, or grilled). Low-sodium or reduced-sodium tomato and vegetable juice. Low-sodium or reduced-sodium tomato sauce and tomato paste. Low-sodium or reduced-sodium canned vegetables.  Fruits  All fresh, dried, or frozen fruit. Canned fruit in natural juice (without  added sugar).  Meat and other protein foods  Skinless chicken or turkey. Ground chicken or turkey. Pork with fat trimmed off. Fish and seafood. Egg whites. Dried beans, peas, or lentils. Unsalted nuts, nut butters, and seeds. Unsalted canned beans. Lean cuts of beef with fat trimmed off. Low-sodium, lean deli meat.  Dairy  Low-fat (1%) or fat-free (skim) milk. Fat-free, low-fat, or reduced-fat cheeses. Nonfat, low-sodium ricotta or cottage cheese. Low-fat or nonfat yogurt. Low-fat, low-sodium cheese.  Fats and oils  Soft margarine without trans fats. Vegetable oil. Low-fat, reduced-fat, or light mayonnaise and salad dressings (reduced-sodium). Canola, safflower, olive, soybean, and sunflower oils. Avocado.  Seasoning and other foods  Herbs. Spices. Seasoning mixes without salt. Unsalted popcorn and pretzels. Fat-free sweets.  What foods are not recommended?  The items listed may not be a complete list. Talk with your dietitian about what dietary choices are best for you.  Grains  Baked goods made with fat, such as croissants, muffins, or some breads. Dry pasta or rice meal packs.  Vegetables  Creamed or fried vegetables. Vegetables in a cheese sauce. Regular canned vegetables (not low-sodium or reduced-sodium). Regular canned tomato sauce and paste (not low-sodium or reduced-sodium). Regular tomato and vegetable juice (not low-sodium or reduced-sodium). Pickles. Olives.  Fruits  Canned fruit in a light or heavy syrup. Fried fruit. Fruit in cream or butter sauce.  Meat and other protein foods  Fatty cuts of meat. Ribs. Fried meat. Juarez. Sausage. Bologna and other processed lunch meats. Salami. Fatback. Hotdogs. Bratwurst. Salted nuts and seeds. Canned beans with added salt. Canned or smoked fish. Whole eggs or egg yolks. Chicken or turkey with skin.  Dairy  Whole or 2% milk, cream, and half-and-half. Whole or full-fat cream cheese. Whole-fat or sweetened yogurt. Full-fat cheese. Nondairy creamers. Whipped toppings.  Processed cheese and cheese spreads.  Fats and oils  Butter. Stick margarine. Lard. Shortening. Ghee. Juarez fat. Tropical oils, such as coconut, palm kernel, or palm oil.  Seasoning and other foods  Salted popcorn and pretzels. Onion salt, garlic salt, seasoned salt, table salt, and sea salt. Worcestershire sauce. Tartar sauce. Barbecue sauce. Teriyaki sauce. Soy sauce, including reduced-sodium. Steak sauce. Canned and packaged gravies. Fish sauce. Oyster sauce. Cocktail sauce. Horseradish that you find on the shelf. Ketchup. Mustard. Meat flavorings and tenderizers. Bouillon cubes. Hot sauce and Tabasco sauce. Premade or packaged marinades. Premade or packaged taco seasonings. Relishes. Regular salad dressings.  Where to find more information:  · National [employer], [employer], [employer] [place]: www.nhlbi.nih.gov  · American Heart Association: www.heart.org  Summary  · The DASH eating plan is a healthy eating plan that has been shown to reduce high blood pressure (hypertension). It may also reduce your risk for type 2 diabetes, heart disease, and stroke.  · With the DASH eating plan, you should limit salt (sodium) intake to 2,300 mg a day. If you have hypertension, you may need to reduce your sodium intake to 1,500 mg a day.  · When on the DASH eating plan, aim to eat more fresh fruits and vegetables, whole grains, lean proteins, low-fat dairy, and heart-healthy fats.  · Work with your health care provider or diet and nutrition specialist (dietitian) to adjust your eating plan to your individual calorie needs.  This information is not intended to replace advice given to you by your health care provider. Make sure you discuss any questions you have with your health care provider.  Document Released: 12/06/2012 Document Revised: 12/11/2017 Document Reviewed: 12/11/2017  [employer] Patient Education © 2019 [employer]

## 2020-02-14 NOTE — TELEPHONE ENCOUNTER
Call let family know that patient has calcium oxalate crystals in her urine which could indicate kidney stones.  This would likely explain the blood in her urine.  To evaluate further for the kidney stones a CAT scan of the abdomen and pelvis to be completed.  This would allow to see how large the stones were and if they were obstructing her ureters and preventing her kidneys from draining completely.  I would have her stop the calcium with vitamin D at least for the time.  She also has a mild noted elevation of calcium on her blood work.  I will continue to monitor this.

## 2020-02-14 NOTE — TELEPHONE ENCOUNTER
Spoke with Chaparrita - daughter   Explained the lab results and recommened CT Scan for kidney stones.  Chaparrita spoke with her mother and Anita declined having the CT Abdomen / Pelvis done . She states she is not in pain.  Chaparrita said she had a CT done in September for the same problem.    Chaparrita stated they will call back if Anita changes her mind or if her symptoms worsen    Anita also does not want to do the Cystoscope that Rosa had mentioned to them before also .

## 2020-02-17 NOTE — PROGRESS NOTES
"Chief Complaint: \"My right knee is doing okay, I really do not have much pain in my left knee.  I have a lot of pain in my right shoulder.\"         History of Present Illness:   Patient: Ms. Anita Cook, 89 y.o. female originally referred by Cecy Camacho PA-C in consultation for chronic intractable bilateral knee pain. Patient reports a several year history of pain, which began without incident. Pain has progressed in intensity over the past  few months. She was last seen on February 12, 2020, and underwent bipolar radiofrequency ablation of the right superomedial, right superolateral and and right inferomedial genicular nerves, from which she is unable to specifically quantify her amount of relief, but does feel she is able to ambulate in a better form with her walker than before.  I discussed with the patient and her daughter it could still be too early to feel significant relief at this point.  The plan was for the patient to undergo a bipolar radiofrequency ablation of the left genicular nerves, but the patient feels as though with her right knee in a better condition she no longer feels intensive pain in her left knee and no longer wishes to move forward with this procedure.  At this time she wishes to be evaluated for her right shoulder pain, this is a new complaint and she has never been evaluated for her shoulder pain in this practice. She returns today for post procedure follow up and evaluation and the new complaint of right shoulder pain.  She has been evaluated by orthopedics for her right shoulder pain, and has previously had x-rays.    Problem #1 Knee pain  Pain Description: Constant pain with intermittent exacerbation, described as aching, dull, popping, and stiff sensation.   Radiation of Pain: The bilateral knee pain does not radiate. She reports pain in her right shin that only occurs during standing or walking. She uses a walker for ambulation. She reports elements suggesting LSS " with claudication  Pain intensity today: 4/10 knee    Average pain intensity last week: 5/10  Pain intensity ranges from: 0/10 to 6/10  Aggravating factors: Pain increases with standing longer than 2 minutes, ambulating more than 50 feet, and climbing steps  Alleviating factors: Pain decreases with sitting, lying down, and analgesics.   Associated Symptoms:   Patient denies numbness or weakness in the lower extremities.   Patient denies any new bladder or bowel problems.   Patient reports difficulties with her balance but denies recent falls    Problem #2 Right shoulder pain  Pain History: Patient reports a several year history of pain, which began without incident. Pain has progressed in intensity over the past 1 year.   Pain Description: constant pain with intermittent exacerbation, described as aching and sharp sensation.   Radiation of Pain: The right shoulder pain radiates into the right forearm   Pain intensity today: 6/10  Average pain intensity last week: 4/10  Pain intensity ranges from: 4/10 to 10/10  Aggravating factors:   Pain increases with rotation, lifting and certain movements.   Alleviating factors: Pain decreases with heat, analgesics and Voltaren gel.   Associated Symptoms:   Patient denies numbness or weakness in the upper extremities.     Review of previous therapies and additional medical records:  Anita Cook has already failed the following measures, including:   Conservative Measures: Oral analgesics, topical analgesics, ice and heat, physical therapy  Interventional Measures: 02/12/2020: bipolar radiofrequency ablation of the right superomedial, right superolateral and and right inferomedial genicular nerves   Patient underwent several bilateral knee injections with Cecy Camacho PA-C, with the last procedure on 12/26/2019. Patient underwent a right shoulder joint injection with Cecy Camacho PA-C,  on 12/12/2019.  Surgical Measures: No previous history of knee surgery  or shoulder surgery.   Anita Cook underwent surgical consultation with Cecy Camacho PA-C on 12/12/2019, and was found not to be a surgical candidate.  Anita Cook presents with significant comorbidities including hypertension, osteoarthritis, GERD, glaucoma, osteopenia, history of stroke in 2018, history of breast cancer 11 years ago; engaged in treatment.  In terms of current analgesics, Anita Cook takes: Voltaren gel, Advil, Tylenol  I have reviewed Yogesh Report #[id number] consistent to medication reconciliation.       Global Pain Scale 01-21 2020 02-19 2020               Pain  10  14               Feelings  1  0               Clinical outcomes  3  9               Activities  14  14               GPS Total:  28  37                  Review of New Diagnostic:  Bilateral shoulder x-rays 10/17/2019: Right shoulder 3 views: Radiographs demonstrate diffuse rotator cuff arthropathy with proximal migration of the humeral head relative to the glenoid.  There is medial erosion of the glenohumeral joint.  There appears to be some slight eccentric wear posteriorly at the glenohumeral articulation  Left shoulder 3 views: Radiographs demonstrate severe glenohumeral arthritis with bone-on-bone articulation at the central portion of the glenohumeral articulation.  There does appear to be significant arthritic changes involving the glenohumeral joint with osteophytes at the inferior portion of the humeral head and glenoid.    Review of Diagnostic Studies:  X-Ray Bilateral Knee on 10/17/2019: I have reviewed the images with the patient and her family. Right Knee: moderate to severe tricompartmental arthritis with genu valgum alignment, periarticular osteophytes visualized in all compartments. Left Knee: mild tricompartmental osteoarthritis.     Review of Systems   Constitutional: Positive for activity change.   Musculoskeletal: Positive for arthralgias, gait problem, myalgias and neck pain.    All other systems reviewed and are negative.        Patient Active Problem List   Diagnosis   • Gastroesophageal reflux disease   • Cataract cortical, senile, bilateral   • Frequent UTI   • History of breast cancer   • Fall   • Essential hypertension   • Osteopenia   • Cerebrovascular accident (CVA) (CMS/HCC)   • Macular degeneration of both eyes   • Glaucoma of right eye   • Chronic pain in right shoulder   • Chronic pain of both knees   • Bilateral primary osteoarthritis of knee   • Gait disturbance   • Lumbar stenosis with neurogenic claudication   • At high risk for falls   • Impaired functional mobility, balance, and endurance   • Hematuria   • Hypercalcemia   • Schatzki's ring       Past Medical History:   Diagnosis Date   • Arthritis    • Breast cancer (CMS/HCC)    • Cataracts, bilateral    • Fractures    • Frequent UTI    • GERD (gastroesophageal reflux disease)    • Glaucoma    • Hypertension    • Low bone density    • Stroke (cerebrum) (CMS/HCC)          Past Surgical History:   Procedure Laterality Date   • BREAST SURGERY  2008   • HERNIA REPAIR  2000   • MASTECTOMY     • MULTIPLE TOOTH EXTRACTIONS     • THYROIDECTOMY, PARTIAL     • TONSILLECTOMY           Family History   Problem Relation Age of Onset   • Hypertension Mother    • Stroke Mother    • Arthritis Father    • Stroke Maternal Aunt          Social History     Socioeconomic History   • Marital status:      Spouse name: Not on file   • Number of children: Not on file   • Years of education: Not on file   • Highest education level: Not on file   Tobacco Use   • Smoking status: Never Smoker   • Smokeless tobacco: Never Used   Substance and Sexual Activity   • Alcohol use: No     Frequency: Never   • Drug use: No   • Sexual activity: Never           Current Outpatient Medications:   •  acetaminophen (TYLENOL) 500 MG tablet, Take 500 mg by mouth Every 6 (Six) Hours As Needed for Mild Pain ., Disp: , Rfl:   •  amLODIPine (NORVASC) 10 MG  "tablet, Take 1 tablet by mouth Daily., Disp: 90 tablet, Rfl: 1  •  aspirin 81 MG chewable tablet, Chew 81 mg Daily., Disp: , Rfl:   •  benazepril (LOTENSIN) 40 MG tablet, Take 1 tablet by mouth Daily., Disp: 90 tablet, Rfl: 1  •  cephalexin (KEFLEX) 250 MG capsule, Take 1 capsule by mouth Daily. (Patient taking differently: Take 250 mg by mouth Daily. prophylaxis for uti), Disp: 30 capsule, Rfl: 11  •  diclofenac (VOLTAREN) 1 % gel gel, Apply 4 g topically to the appropriate area as directed 4 (Four) Times a Day., Disp: 100 g, Rfl: 12  •  Dietary Management Product (RHEUMATE) capsule, Take 1 capsule twice daily, Disp: 180 capsule, Rfl: 1  •  famotidine (PEPCID) 40 MG tablet, Take 1 tablet by mouth Daily., Disp: 90 tablet, Rfl: 1  •  ibuprofen (ADVIL,MOTRIN) 200 MG tablet, Take 200 mg by mouth Every 6 (Six) Hours As Needed for Mild Pain ., Disp: , Rfl:   •  metoprolol succinate XL (TOPROL-XL) 50 MG 24 hr tablet, Take 1 tablet by mouth Daily., Disp: 90 tablet, Rfl: 1  •  Multiple Vitamins-Minerals (CENTRUM SILVER PO), Take  by mouth., Disp: , Rfl:   •  Multiple Vitamins-Minerals (PRESERVISION AREDS PO), Take 1 tablet by mouth 2 (Two) Times a Day., Disp: , Rfl:   •  dorzolamide-timolol (COSOPT) 22.3-6.8 MG/ML ophthalmic solution, Administer 1 drop to both eyes 2 (Two) Times a Day., Disp: , Rfl:       Allergies   Allergen Reactions   • Bactrim [Sulfamethoxazole-Trimethoprim] Rash         Ht 152.4 cm (60\")   Wt 49 kg (108 lb)   BMI 21.09 kg/m²       Physical Exam:  Constitutional: Patient is oriented to person, place, and time.   Patient appears well-developed and well-nourished.   HEENT: Head: Normocephalic and atraumatic.   Eyes: Conjunctivae and lids are normal.   Pupils: Equal, round, reactive to light.   Neck: Trachea normal. Neck supple. No JVD present.   Pulmonary Respiratory effort: No increased work of breathing or signs of respiratory distress. Auscultation of lungs: Clear to auscultation.   Cardiovascular " Auscultation of heart: Normal rate and rhythm, normal S1 and S2, no murmurs.   Peripheral vascular exam: Femoral: right 2+, left 2+. Posterior tibialis: right 2+ and left 2+. Dorsalis pedis: right 2+ and left 2+. No edema. Musculoskeletal   Gait and station: Gait evaluation demonstrated shuffling. She is able to walk for short distances with a walker. She leans forward.  Muscles: Presence of active trigger points right levator scapulae and right trapezius   Shoulders: The range of motion of the right glenohumeral joint is severely decreased secondary to pain, tenderness noted. Rotator cuff strength is 4/5. Positive Tinels sign at the right suprascapularis.   Lumbar spine: Passive and active range of motion are limited but without pain. Extension, flexion, lateral flexion, rotation of the lumbar spine did not increase or reproduce pain. Lumbar facet joint loading maneuvers are negative.   Maximus test and Gaenslen's test are negative   Palpation of the bilateral greater trochanter, unrevealing   Examination of the Iliotibial band: unrevealing   Hip joints: The range of motion of the hip joints is limited but without pain   Right knee: Gross right valgus deformity of the knee joint. -10 extension, 100 degrees of flexion. Mild laxity. No tenderness found. No MCL and no LCL tenderness noted. Crepitus.  Left knee: Range of motion 0-110. No ACL, PCL, LCL or MCL laxity. No tenderness found. No MCL and no LCL tenderness noted. Crepitus.   Neurological:   Patient is alert and oriented to person, place, and time.   Speech: Speech is normal.   Cortical function: Normal mental status.   Cranial nerves: Cranial nerves 2-12 intact.   Reflex Scores:  Right patellar: 1+  Left patellar: 1+  Right Achilles: 1+  Left Achilles: 1+  Motor strength: 5/5  Motor Tone: normal tone.   Involuntary movements: none.   Superficial/Primitive Reflexes: primitive reflexes were absent.   Right Spencer: absent  Left Spencer: absent  Right ankle  clonus: absent  Left ankle clonus: absent   Babinsky: absent  Negative long tract signs. Straight leg raising test is negative. Femoral stretch sign is negative.   Sensation: No sensory loss. Sensory exam: intact to light touch, intact pain and temperature sensation, intact vibration sensation and normal proprioception.   Coordination: Normal finger to nose and heel to shin. Abnormal balance. Negative Romberg's sign   Skin and subcutaneous tissue: Skin is warm and intact. No rash noted. No cyanosis.   Psychiatric: Judgment and insight: Normal. Orientation to person, place and time: Normal. Recent and remote memory: Intact. Mood and affect: Normal.     ASSESSMENT:   1. Osteoarthritis of right glenohumeral joint    2. Nerve entrapment syndrome, suprascapular, right    3. Osteoarthritis of both shoulders, unspecified osteoarthritis type    4. Bilateral primary osteoarthritis of knee    5. Lumbar stenosis with neurogenic claudication    6. Osteopenia, unspecified location    7. At high risk for falls    8. Impaired functional mobility, balance, and endurance        PLAN/MEDICAL DECISION MAKING: I had a lengthy conversation with Ms. Anita Meme Cook regarding her chronic pain condition and potential therapeutic options including risks, benefits, alternative therapies, to name a few. Patient has failed to obtain pain relief with conservative measures and previous interventional pain management measures, as referenced above. Patient reports severe bilateral knee pain and some right leg pain with intolerance to standing and walking. The plan was for the patient to undergo a bipolar radiofrequency ablation of the left genicular nerves, but the patient feels as though with her right knee in a better condition she no longer feels intensive pain in her left knee and no longer wishes to move forward with this procedure.  At this time she wishes to be evaluated for her right shoulder pain, this is a new complaint and she has  never been evaluated for her shoulder pain in this practice. She has been evaluated by orthopedics for her right shoulder pain, and has previously had x-rays.  Shoulder x-rays revealed severe end-stage osteoarthritis.  I had a discussion with the patient and her daughter, and at this time I told her since she has not been evaluated for her shoulder pain I will discuss this with Dr. Alonzo and contact the patient.  I have reviewed all available patient's medical records as well as previous therapies as referenced above, and discussed the patient with Dr. Alonzo.  Therefore, I have proposed the following plan:  1. Interventional pain management measures:   A. Chronic knee pain: None indicated at this time.  Patient wishes to not move forward with bipolar radiofrequency ablation of the left superior medial genicular nerve, left superior lateral genicular nerve and left inferior medial genicular nerve at this time. Otherwise, we will obtain an MRI of the lumbar spine w/o contrast. Depending on MRI findings, we may contemplate diagnostic and therapeutic bilateral lumbar transforaminal epidural steroid injections. Other options would include a spinal cord stimulator trial or PNS trial.  B. Right shoulder pain: Patient will be scheduled for diagnostic and therapeutic right suprascapular nerve block under ultrasound guidance and peripheral nerve stimulation combined with right glenohumeral joint injection with local anesthetic and steroids to maximize pain relief.  Patient presents with end-stage right glenohumeral joint osteoarthritis with severe pain both at rest and with movement of her upper extremity.  She has been previously evaluated by Dr. Julien Leyva and found not to be an appropriate surgical candidate for shoulder replacement.  In addition, we could consider regenerative therapies, or eventually denervation of the right glenohumeral joint.  Other options would include peripheral nerve stimulation of the  right suprascapular nerve with the Stimwave system.  2. Diagnostic studies: It has been discussed the possibility of an MRI of the lumbar spine without contrast  3. Pharmacological measures: Reviewed. Discussed.   A. Patient takes Voltaren gel, Advil, Tylenol  B. Continue with Rheumate one tablet twice daily  C. Continue with prilocaine 2%, lidocaine 10%, capsaicin 0.001% and mannitol 20%  cream, apply 1 to 2 grams of cream to the affected areas every 4 to 6 hours as needed  4. Long-term rehabilitation efforts  A. The patient does not havea history of falls. I did complete a risk assessment for falls.   B.  Patient will start a comprehensive physical therapy program at [facility] for water therapy, core strengthening, gait and balance training and dry needling once pain is under control  C. Continue with brace for for stabilization of the medial and lateral compartments.   D. Contrast therapy: Apply ice-packs for 15-20 minutes, followed by heating pads for 15-20 minutes to affected area   5. The patient and her family have been instructed to contact my office with any questions or difficulties. The patient understands the plan and agrees to proceed accordingly.           Patient Care Team:  Shae Del Toro APRN as PCP - General (Internal Medicine)  Cecy Camahco PA-C as Physician Assistant (Physician Assistant)     No orders of the defined types were placed in this encounter.        Future Appointments   Date Time Provider Department Center   3/26/2020  1:40 PM Cecy Camacho PA-C [facility] None   8/12/2020  8:15 AM Shae Del Toro APRN [facility] None         SHANTA Peters     EMR Dragon/Transcription disclaimer:  Much of this encounter note is an electronic transcription of spoken language to printed text. Electronic transcription of spoken language may permit erroneous, or at times, nonsensical words or phrases to be inadvertently transcribed. Although I have reviewed the note for  such errors, some may still exist.

## 2020-02-19 ENCOUNTER — OFFICE VISIT (OUTPATIENT)
Dept: PAIN MEDICINE | Facility: CLINIC | Age: 85
End: 2020-02-19

## 2020-02-19 VITALS — BODY MASS INDEX: 21.2 KG/M2 | HEIGHT: 60 IN | WEIGHT: 108 LBS

## 2020-02-19 DIAGNOSIS — Z91.81 AT HIGH RISK FOR FALLS: ICD-10-CM

## 2020-02-19 DIAGNOSIS — M17.0 BILATERAL PRIMARY OSTEOARTHRITIS OF KNEE: ICD-10-CM

## 2020-02-19 DIAGNOSIS — Z74.09 IMPAIRED FUNCTIONAL MOBILITY, BALANCE, AND ENDURANCE: ICD-10-CM

## 2020-02-19 DIAGNOSIS — M48.062 LUMBAR STENOSIS WITH NEUROGENIC CLAUDICATION: ICD-10-CM

## 2020-02-19 DIAGNOSIS — G56.81: ICD-10-CM

## 2020-02-19 DIAGNOSIS — M19.011 OSTEOARTHRITIS OF RIGHT GLENOHUMERAL JOINT: ICD-10-CM

## 2020-02-19 DIAGNOSIS — M19.011 OSTEOARTHRITIS OF BOTH SHOULDERS, UNSPECIFIED OSTEOARTHRITIS TYPE: ICD-10-CM

## 2020-02-19 DIAGNOSIS — M19.012 OSTEOARTHRITIS OF BOTH SHOULDERS, UNSPECIFIED OSTEOARTHRITIS TYPE: ICD-10-CM

## 2020-02-19 DIAGNOSIS — M85.80 OSTEOPENIA, UNSPECIFIED LOCATION: ICD-10-CM

## 2020-02-19 PROCEDURE — 99024 POSTOP FOLLOW-UP VISIT: CPT | Performed by: NURSE PRACTITIONER

## 2020-02-19 PROCEDURE — 99214 OFFICE O/P EST MOD 30 MIN: CPT | Performed by: NURSE PRACTITIONER

## 2020-02-19 RX ORDER — DORZOLAMIDE HYDROCHLORIDE AND TIMOLOL MALEATE 20; 5 MG/ML; MG/ML
1 SOLUTION/ DROPS OPHTHALMIC 2 TIMES DAILY
COMMUNITY
Start: 2020-02-03 | End: 2020-08-06

## 2020-02-21 DIAGNOSIS — M19.011 OSTEOARTHRITIS OF RIGHT GLENOHUMERAL JOINT: Primary | ICD-10-CM

## 2020-02-22 PROBLEM — L82.1 SK (SEBORRHEIC KERATOSIS): Status: ACTIVE | Noted: 2020-02-22

## 2020-02-27 ENCOUNTER — TELEPHONE (OUTPATIENT)
Dept: INTERNAL MEDICINE | Facility: CLINIC | Age: 85
End: 2020-02-27

## 2020-02-27 DIAGNOSIS — M19.011 OSTEOARTHRITIS OF BOTH SHOULDERS, UNSPECIFIED OSTEOARTHRITIS TYPE: ICD-10-CM

## 2020-02-27 DIAGNOSIS — Z91.81 AT HIGH RISK FOR FALLS: ICD-10-CM

## 2020-02-27 DIAGNOSIS — M85.80 OSTEOPENIA, UNSPECIFIED LOCATION: ICD-10-CM

## 2020-02-27 DIAGNOSIS — M48.062 LUMBAR STENOSIS WITH NEUROGENIC CLAUDICATION: ICD-10-CM

## 2020-02-27 DIAGNOSIS — E83.52 HYPERCALCEMIA: Primary | ICD-10-CM

## 2020-02-27 DIAGNOSIS — M19.012 OSTEOARTHRITIS OF BOTH SHOULDERS, UNSPECIFIED OSTEOARTHRITIS TYPE: ICD-10-CM

## 2020-02-27 DIAGNOSIS — G56.81: ICD-10-CM

## 2020-02-27 DIAGNOSIS — Z74.09 IMPAIRED FUNCTIONAL MOBILITY, BALANCE, AND ENDURANCE: ICD-10-CM

## 2020-02-27 DIAGNOSIS — M19.011 OSTEOARTHRITIS OF RIGHT GLENOHUMERAL JOINT: Primary | ICD-10-CM

## 2020-02-27 DIAGNOSIS — M17.0 BILATERAL PRIMARY OSTEOARTHRITIS OF KNEE: ICD-10-CM

## 2020-02-27 NOTE — TELEPHONE ENCOUNTER
----- Message from Elissa Rodarte MA sent at 2/18/2020  7:42 AM EST -----  Regarding: FW: Test Results Question  Contact: [phone]      ----- Message -----  From: Anita Veradebra  Sent: 2/17/2020  10:38 PM EST  To: Marilin Piña [facility]  Subject: Test Results Question                            I have a question about URINALYSIS, MICROSCOPIC ONLY resulted on 2/4/20, 10:49 PM.    Per previous communication, Anita has stopped all calcium supplement intake. Should she have her blood drawn in one more week to check if that made a difference? Would that be a fasting test?    I'm still hoping she will at least do the CT to further investigate the blood in her urine.     Thank you for your help.     Chaparrita for Anita

## 2020-02-28 ENCOUNTER — LAB (OUTPATIENT)
Dept: INTERNAL MEDICINE | Facility: CLINIC | Age: 85
End: 2020-02-28

## 2020-02-28 DIAGNOSIS — E83.52 HYPERCALCEMIA: ICD-10-CM

## 2020-02-28 LAB
ALBUMIN SERPL-MCNC: 4.3 G/DL (ref 3.5–5.2)
ALBUMIN/GLOB SERPL: 1.7 G/DL
ALP SERPL-CCNC: 59 U/L (ref 39–117)
ALT SERPL W P-5'-P-CCNC: 9 U/L (ref 1–33)
ANION GAP SERPL CALCULATED.3IONS-SCNC: 12.3 MMOL/L (ref 5–15)
AST SERPL-CCNC: 15 U/L (ref 1–32)
BILIRUB SERPL-MCNC: 0.3 MG/DL (ref 0.2–1.2)
BUN BLD-MCNC: 19 MG/DL (ref 8–23)
BUN/CREAT SERPL: 32.2 (ref 7–25)
CALCIUM SPEC-SCNC: 9.1 MG/DL (ref 8.2–9.6)
CHLORIDE SERPL-SCNC: 99 MMOL/L (ref 98–107)
CO2 SERPL-SCNC: 25.7 MMOL/L (ref 22–29)
CREAT BLD-MCNC: 0.59 MG/DL (ref 0.57–1)
GFR SERPL CREATININE-BSD FRML MDRD: 95 ML/MIN/1.73
GLOBULIN UR ELPH-MCNC: 2.6 GM/DL
GLUCOSE BLD-MCNC: 99 MG/DL (ref 65–99)
POTASSIUM BLD-SCNC: 3.9 MMOL/L (ref 3.5–5.2)
PROT SERPL-MCNC: 6.9 G/DL (ref 6–8.5)
SODIUM BLD-SCNC: 137 MMOL/L (ref 136–145)

## 2020-02-28 PROCEDURE — 36415 COLL VENOUS BLD VENIPUNCTURE: CPT | Performed by: NURSE PRACTITIONER

## 2020-02-28 PROCEDURE — 80053 COMPREHEN METABOLIC PANEL: CPT | Performed by: NURSE PRACTITIONER

## 2020-03-02 DIAGNOSIS — Z12.31 BREAST CANCER SCREENING BY MAMMOGRAM: Primary | ICD-10-CM

## 2020-03-04 ENCOUNTER — OUTSIDE FACILITY SERVICE (OUTPATIENT)
Dept: PAIN MEDICINE | Facility: CLINIC | Age: 85
End: 2020-03-04

## 2020-03-04 PROCEDURE — 20610 DRAIN/INJ JOINT/BURSA W/O US: CPT | Performed by: ANESTHESIOLOGY

## 2020-03-04 PROCEDURE — 64418 NJX AA&/STRD SPRSCAP NRV: CPT | Performed by: ANESTHESIOLOGY

## 2020-03-04 PROCEDURE — 99152 MOD SED SAME PHYS/QHP 5/>YRS: CPT | Performed by: ANESTHESIOLOGY

## 2020-03-05 ENCOUNTER — TELEPHONE (OUTPATIENT)
Dept: PAIN MEDICINE | Facility: CLINIC | Age: 85
End: 2020-03-05

## 2020-03-05 NOTE — TELEPHONE ENCOUNTER
DX/THERA RT SUPRASCAPULAR NERVE BLOCK & RT GLENOHUMERAL JOINT INJ 03/04/2020.    Attempted to contact patient; no answer. No option to leave voicemail.

## 2020-03-16 ENCOUNTER — TELEPHONE (OUTPATIENT)
Dept: INTERNAL MEDICINE | Facility: CLINIC | Age: 85
End: 2020-03-16

## 2020-03-16 DIAGNOSIS — Z23 IMMUNIZATION DUE: Primary | ICD-10-CM

## 2020-03-16 NOTE — TELEPHONE ENCOUNTER
Patient sent us a message asking to get her T-Dap and Pneumococcal vaccines as soon as possible. Please advise?

## 2020-03-16 NOTE — TELEPHONE ENCOUNTER
Spoke with Chaparrita and she really wants patient to have the vaccination.Patient will come in early 03/17/2020 and I will administer for her.

## 2020-03-16 NOTE — TELEPHONE ENCOUNTER
Let the patient know that Tdap cannot be given unless he pays out-of-pocket.  I did place a pneumonia vaccination in future for her she would like to get on the nursing schedule to have obtained.

## 2020-03-17 ENCOUNTER — CLINICAL SUPPORT (OUTPATIENT)
Dept: INTERNAL MEDICINE | Facility: CLINIC | Age: 85
End: 2020-03-17

## 2020-03-17 DIAGNOSIS — Z23 IMMUNIZATION DUE: ICD-10-CM

## 2020-03-17 PROCEDURE — 90732 PPSV23 VACC 2 YRS+ SUBQ/IM: CPT | Performed by: NURSE PRACTITIONER

## 2020-03-17 PROCEDURE — G0009 ADMIN PNEUMOCOCCAL VACCINE: HCPCS | Performed by: NURSE PRACTITIONER

## 2020-03-23 ENCOUNTER — TELEPHONE (OUTPATIENT)
Dept: INTERNAL MEDICINE | Facility: CLINIC | Age: 85
End: 2020-03-23

## 2020-03-23 DIAGNOSIS — Z23 NEED FOR TDAP VACCINATION: Primary | ICD-10-CM

## 2020-03-23 NOTE — TELEPHONE ENCOUNTER
Let the patient know she will need to pay out-of-pocket as Medicare does not cover this cost unless she has an injury such as a laceration or puncture wound.

## 2020-03-23 NOTE — TELEPHONE ENCOUNTER
Patient has been informed and understands she will have to pay out of pocket. I will be giving her vaccination first thing Wednesday.

## 2020-03-23 NOTE — TELEPHONE ENCOUNTER
----- Message from Flora Jimenes RN sent at 3/23/2020  2:31 PM EDT -----  Regarding: FW: Prescription Question  Contact: [phone]      ----- Message -----  From: Anita Cook  Sent: 3/23/2020   1:34 PM EDT  To: Marilin Duran [facility]  Subject: Prescription Question                            I would like to schedule a Tdap booster vaccine appointment with the nurse at the soonest convenience.   Thank you.   Anita Cook ( by Chaparrita Pepper)

## 2020-03-25 ENCOUNTER — CLINICAL SUPPORT (OUTPATIENT)
Dept: INTERNAL MEDICINE | Facility: CLINIC | Age: 85
End: 2020-03-25

## 2020-03-25 DIAGNOSIS — Z23 NEED FOR TDAP VACCINATION: ICD-10-CM

## 2020-03-25 PROCEDURE — 90715 TDAP VACCINE 7 YRS/> IM: CPT | Performed by: NURSE PRACTITIONER

## 2020-03-25 PROCEDURE — 90471 IMMUNIZATION ADMIN: CPT | Performed by: NURSE PRACTITIONER

## 2020-04-30 ENCOUNTER — APPOINTMENT (OUTPATIENT)
Dept: MAMMOGRAPHY | Facility: HOSPITAL | Age: 85
End: 2020-04-30

## 2020-04-30 RX ORDER — FAMOTIDINE 40 MG/1
TABLET, FILM COATED ORAL
Qty: 90 TABLET | Refills: 0 | Status: SHIPPED | OUTPATIENT
Start: 2020-04-30 | End: 2020-08-25

## 2020-05-13 ENCOUNTER — APPOINTMENT (OUTPATIENT)
Dept: MAMMOGRAPHY | Facility: HOSPITAL | Age: 85
End: 2020-05-13

## 2020-05-26 NOTE — PROGRESS NOTES
"Chief Complaint: \"My right knee is doing okay, I really do not have much pain in my left knee.  I have a lot of pain in my right shoulder.\"         History of Present Illness:   Patient: Ms. Anita Cook, 89 y.o. female originally referred by Cecy Camacho PA-C in consultation for chronic intractable bilateral knee pain. Patient reports a several year history of pain, which began without incident.  At the time of her last office visit with me in February she wished to be evaluated for her right shoulder pain.  She was last seen on March 4, 2020, when she underwent diagnostic and therapeutic right suprascapular nerve block combined with diagnostic and therapeutic right glenohumeral joint injection, from which she reports experiencing minimal pain relief.  She was able to participate in a short course of physical therapy after her procedure, but for only her knee, they did not work on her shoulder. Unfortunately, she had to discontinue due to COVID-19 resulting in multiple PT closures.  On February 12, 2020, she underwent bipolar radiofrequency ablation of the right superomedial, right superolateral and and right inferomedial genicular nerves, from which at the time of her follow-up visit she was unable to specifically quantify her amount of relief, but continues to feel she is able to ambulate in a better form with her walker than before.  At that time she felt as though with her right knee in a better condition she no longer feels intensive pain in her left knee and no longer wished to move forward with this procedure for her left knee.  She returns today for postprocedure follow-up and evaluation.    Problem #1 Right shoulder pain  Pain History: Patient reports a several year history of pain, which began without incident. Pain has progressed in intensity over the past 1 year.   Pain Description: constant pain with intermittent exacerbation, described as aching and sharp sensation.   Radiation of Pain: " The right shoulder pain radiates into the right forearm   Pain intensity today: 5/10  Average pain intensity last week: 4/10  Pain intensity ranges from: 4/10 to 10/10  Aggravating factors: Pain increases with rotation, lifting and certain movements.   Alleviating factors: Pain decreases with heat, analgesics and Voltaren gel.   Associated Symptoms:   Patient denies numbness or weakness in the upper extremities.     Problem #2 Knee pain  Pain Description: Constant pain with intermittent exacerbation, described as aching, dull, popping, and stiff sensation.   Radiation of Pain: The bilateral knee pain does not radiate. She reports pain in her right shin that only occurs during standing or walking. She uses a walker for ambulation. She reports elements suggesting LSS with claudication  Pain intensity today: 4/10 knee    Average pain intensity last week: 5/10  Pain intensity ranges from: 0/10 to 6/10  Aggravating factors: Pain increases with standing longer than 2 minutes, ambulating more than 50 feet, and climbing steps  Alleviating factors: Pain decreases with sitting, lying down, and analgesics.   Associated Symptoms:   Patient denies numbness or weakness in the lower extremities.   Patient denies any new bladder or bowel problems.   Patient reports difficulties with her balance but denies recent falls      Review of previous therapies and additional medical records:  Anita Cook has already failed the following measures, including:   Conservative Measures: Oral analgesics, topical analgesics, ice and heat, physical therapy  Interventional Measures: 02/12/2020: bipolar radiofrequency ablation of the right superomedial, right superolateral and and right inferomedial genicular nerves   Patient underwent several bilateral knee injections with Cecy Camacho PA-C, with the last procedure on 12/26/2019. Patient underwent a right shoulder joint injection with Cecy Camacho PA-C,  on  12/12/2019.  Surgical Measures: No previous history of knee surgery or shoulder surgery.   Anita Cook underwent surgical consultation with Cecy Camacho PA-C on 12/12/2019, and was found not to be a surgical candidate.  Anita Cook presents with significant comorbidities including hypertension, osteoarthritis, GERD, glaucoma, osteopenia, history of stroke in 2018, history of breast cancer 11 years ago; engaged in treatment.  In terms of current analgesics, Anita Cook takes: Voltaren gel, Advil, Tylenol  I have reviewed Yogesh Report #[id number] consistent to medication reconciliation.       Global Pain Scale 01-21 2020 02-19 2020 05-27 2020             Pain  10  14  12             Feelings  1  0  0             Clinical outcomes  3  9  8             Activities  14  14  15             GPS Total:  28  37  35                Review of Diagnostic Studies:  Bilateral shoulder x-rays 10/17/2019: Right shoulder 3 views: Radiographs demonstrate diffuse rotator cuff arthropathy with proximal migration of the humeral head relative to the glenoid.  There is medial erosion of the glenohumeral joint.  There appears to be some slight eccentric wear posteriorly at the glenohumeral articulation  Left shoulder 3 views: Radiographs demonstrate severe glenohumeral arthritis with bone-on-bone articulation at the central portion of the glenohumeral articulation.  There does appear to be significant arthritic changes involving the glenohumeral joint with osteophytes at the inferior portion of the humeral head and glenoid.  X-Ray Bilateral Knee on 10/17/2019: I have reviewed the images with the patient and her family. Right Knee: moderate to severe tricompartmental arthritis with genu valgum alignment, periarticular osteophytes visualized in all compartments. Left Knee: mild tricompartmental osteoarthritis.     Review of Systems   Constitutional: Positive for activity change.   Musculoskeletal: Positive  for arthralgias, gait problem, myalgias and neck pain.   All other systems reviewed and are negative.        Patient Active Problem List   Diagnosis   • Gastroesophageal reflux disease   • Cataract cortical, senile, bilateral   • Frequent UTI   • History of breast cancer   • Fall   • Essential hypertension   • Osteopenia   • Cerebrovascular accident (CVA) (CMS/HCC)   • Macular degeneration of both eyes   • Glaucoma of right eye   • Chronic pain in right shoulder   • Chronic pain of both knees   • Bilateral primary osteoarthritis of knee   • Gait disturbance   • Lumbar stenosis with neurogenic claudication   • At high risk for falls   • Impaired functional mobility, balance, and endurance   • Hematuria   • Hypercalcemia   • Schatzki's ring   • SK (seborrheic keratosis)       Past Medical History:   Diagnosis Date   • Arthritis    • Breast cancer (CMS/HCC)    • Cataracts, bilateral    • Fractures    • Frequent UTI    • GERD (gastroesophageal reflux disease)    • Glaucoma    • Hypertension    • Low bone density    • Stroke (cerebrum) (CMS/HCC)          Past Surgical History:   Procedure Laterality Date   • BREAST SURGERY  2008   • HERNIA REPAIR  2000   • MASTECTOMY     • MULTIPLE TOOTH EXTRACTIONS     • THYROIDECTOMY, PARTIAL     • TONSILLECTOMY           Family History   Problem Relation Age of Onset   • Hypertension Mother    • Stroke Mother    • Arthritis Father    • Stroke Maternal Aunt          Social History     Socioeconomic History   • Marital status:      Spouse name: Not on file   • Number of children: Not on file   • Years of education: Not on file   • Highest education level: Not on file   Tobacco Use   • Smoking status: Never Smoker   • Smokeless tobacco: Never Used   Substance and Sexual Activity   • Alcohol use: No     Frequency: Never   • Drug use: No   • Sexual activity: Never           Current Outpatient Medications:   •  acetaminophen (TYLENOL) 500 MG tablet, Take 500 mg by mouth Every 6 (Six)  Hours As Needed for Mild Pain ., Disp: , Rfl:   •  amLODIPine (NORVASC) 10 MG tablet, Take 1 tablet by mouth Daily., Disp: 90 tablet, Rfl: 1  •  aspirin 81 MG chewable tablet, Chew 81 mg Daily., Disp: , Rfl:   •  benazepril (LOTENSIN) 40 MG tablet, Take 1 tablet by mouth Daily., Disp: 90 tablet, Rfl: 1  •  cephalexin (KEFLEX) 250 MG capsule, Take 1 capsule by mouth Daily. (Patient taking differently: Take 250 mg by mouth Daily. prophylaxis for uti), Disp: 30 capsule, Rfl: 11  •  diclofenac (VOLTAREN) 1 % gel gel, Apply 4 g topically to the appropriate area as directed 4 (Four) Times a Day., Disp: 100 g, Rfl: 12  •  Dietary Management Product (RHEUMATE) capsule, Take 1 capsule twice daily, Disp: 180 capsule, Rfl: 1  •  dorzolamide-timolol (COSOPT) 22.3-6.8 MG/ML ophthalmic solution, Administer 1 drop to both eyes 2 (Two) Times a Day., Disp: , Rfl:   •  famotidine (PEPCID) 40 MG tablet, Take 1 tablet by mouth once daily, Disp: 90 tablet, Rfl: 0  •  ibuprofen (ADVIL,MOTRIN) 200 MG tablet, Take 200 mg by mouth Every 6 (Six) Hours As Needed for Mild Pain ., Disp: , Rfl:   •  metoprolol succinate XL (TOPROL-XL) 50 MG 24 hr tablet, Take 1 tablet by mouth Daily., Disp: 90 tablet, Rfl: 1  •  Multiple Vitamins-Minerals (CENTRUM SILVER PO), Take  by mouth., Disp: , Rfl:   •  Multiple Vitamins-Minerals (PRESERVISION AREDS PO), Take 1 tablet by mouth 2 (Two) Times a Day., Disp: , Rfl:       Allergies   Allergen Reactions   • Bactrim [Sulfamethoxazole-Trimethoprim] Rash         There were no vitals taken for this visit.      Physical Exam:  Constitutional: Patient is oriented to person, place, and time.   Patient appears well-developed and well-nourished.   HEENT: Head: Normocephalic and atraumatic.   Eyes: Conjunctivae and lids are normal.   Pupils: Equal, round, reactive to light.   Neck: Trachea normal. Neck supple. No JVD present.   Pulmonary Respiratory effort: No increased work of breathing or signs of respiratory distress.  Auscultation of lungs: Clear to auscultation.   Cardiovascular Auscultation of heart: Normal rate and rhythm, normal S1 and S2, no murmurs.   Peripheral vascular exam: Femoral: right 2+, left 2+. Posterior tibialis: right 2+ and left 2+. Dorsalis pedis: right 2+ and left 2+. No edema. Musculoskeletal   Gait and station: Gait evaluation demonstrated shuffling. She is able to walk for short distances with a walker. She leans forward.  Muscles: Presence of active trigger points right levator scapulae and right trapezius   Shoulders: The range of motion of the right glenohumeral joint is severely decreased secondary to pain, tenderness and effusion noted. Rotator cuff strength is 3/5. Positive Tinels sign at the right suprascapularis.   Lumbar spine: Passive and active range of motion are limited but without pain. Extension, flexion, lateral flexion, rotation of the lumbar spine did not increase or reproduce pain. Lumbar facet joint loading maneuvers are negative.   Maximus test and Gaenslen's test are negative   Palpation of the bilateral greater trochanter, unrevealing   Examination of the Iliotibial band: unrevealing   Hip joints: The range of motion of the hip joints is limited but without pain   Right knee: Gross right valgus deformity of the knee joint. -10 extension, 100 degrees of flexion. Mild laxity. No tenderness found. No MCL and no LCL tenderness noted. Crepitus.  Left knee: Range of motion 0-110. No ACL, PCL, LCL or MCL laxity. No tenderness found. No MCL and no LCL tenderness noted. Crepitus.   Neurological:   Patient is alert and oriented to person, place, and time.   Speech: Speech is normal.   Cortical function: Normal mental status.   Cranial nerves: Cranial nerves 2-12 intact.   Reflex Scores:  Right patellar: 1+  Left patellar: 1+  Right Achilles: 1+  Left Achilles: 1+  Motor strength: 5/5  Motor Tone: normal tone.   Involuntary movements: none.   Superficial/Primitive Reflexes: primitive reflexes  were absent.   Right Spencer: absent  Left Spencer: absent  Right ankle clonus: absent  Left ankle clonus: absent   Babinsky: absent  Negative long tract signs. Straight leg raising test is negative. Femoral stretch sign is negative.   Sensation: No sensory loss. Sensory exam: intact to light touch, intact pain and temperature sensation, intact vibration sensation and normal proprioception.   Coordination: Normal finger to nose and heel to shin. Abnormal balance. Negative Romberg's sign   Skin and subcutaneous tissue: Skin is warm and intact. No rash noted. No cyanosis.   Psychiatric: Judgment and insight: Normal. Orientation to person, place and time: Normal. Recent and remote memory: Intact. Mood and affect: Normal.     ASSESSMENT:   1. Nerve entrapment syndrome, suprascapular, right    2. Osteoarthritis of both shoulders, unspecified osteoarthritis type    3. Bilateral primary osteoarthritis of knee    4. Lumbar stenosis with neurogenic claudication    5. Osteopenia, unspecified location    6. At high risk for falls    7. Impaired functional mobility, balance, and endurance        PLAN/MEDICAL DECISION MAKING: I had a lengthy conversation with Ms. Anita Valentine Joyce regarding her chronic pain condition and potential therapeutic options including risks, benefits, alternative therapies, to name a few. Patient has failed to obtain pain relief with conservative measures and previous interventional pain management measures, as referenced above. Patient reports severe bilateral knee pain and some right leg pain with intolerance to standing and walking. The plan was for the patient to undergo a bipolar radiofrequency ablation of the left genicular nerves, but the patient feels as though with her right knee in a better condition she no longer feels intensive pain in her left knee and no longer wishes to move forward with this procedure.  In regards to her shoulder pain,  x-rays revealed severe end-stage osteoarthritis.   I have reviewed all available patient's medical records as well as previous therapies as referenced above, and discussed the patient with Dr. Alonzo.  Therefore, I have proposed the following plan:  1. Interventional pain management measures:   A. Chronic knee pain: None indicated at this time.  Patient wishes to not move forward with bipolar radiofrequency ablation of the left superior medial genicular nerve, left superior lateral genicular nerve and left inferior medial genicular nerve at this time. Otherwise, we will obtain an MRI of the lumbar spine w/o contrast. Depending on MRI findings, we may contemplate diagnostic and therapeutic bilateral lumbar transforaminal epidural steroid injections. Other options would include a spinal cord stimulator trial or PNS trial.  B. Right shoulder pain: Patient will be scheduled for diagnostic right suprascapular nerve block and right axillary nerve block. If patient experiences more than 50% pain relief and improvement the range of motion of the shoulder joint, then, patient will be scheduled for a second set of diagnostic right suprascapular nerve block and right axillary nerve block, to then, proceed with bipolar radiofrequency ablation of the right suprascapular nerve and axillary nerve. Patient presents with end-stage right glenohumeral joint osteoarthritis with severe pain both at rest and with movement of her upper extremity. She has been previously evaluated by Dr. Julien Leyva and found not to be an appropriate surgical candidate for shoulder replacement.  In addition, we could consider regenerative therapies, or peripheral nerve stimulation of the right suprascapular nerve with the Stimwave system.  2. Diagnostic studies: It has been discussed the possibility of an MRI of the lumbar spine without contrast  3. Pharmacological measures: Reviewed. Discussed.   A. Patient takes Voltaren gel, Advil, Tylenol  B. Continue with Rheumate one tablet twice daily  C.  Continue with prilocaine 2%, lidocaine 10%, capsaicin 0.001% and mannitol 20%  cream, apply 1 to 2 grams of cream to the affected areas every 4 to 6 hours as needed  4. Long-term rehabilitation efforts  A. The patient does not havea history of falls. I did complete a risk assessment for falls.   B.  Patient will start a comprehensive physical therapy program at [facility] for water therapy, core strengthening, gait and balance training and dry needling once pain is under control  C. Continue with brace for for stabilization of the medial and lateral compartments.   D. Contrast therapy: Apply ice-packs for 15-20 minutes, followed by heating pads for 15-20 minutes to affected area   5. The patient and her family have been instructed to contact my office with any questions or difficulties. The patient understands the plan and agrees to proceed accordingly.           Patient Care Team:  Shae Del Toro APRN as PCP - General (Internal Medicine)  Cecy Camacho PA-C as Physician Assistant (Physician Assistant)     No orders of the defined types were placed in this encounter.        Future Appointments   Date Time Provider Department Center   6/1/2020  1:00 PM Rambo Alonzo MD [facility] None   7/9/2020 11:20 AM [facility] BR SCREENING [facility] 60 [facility]   8/12/2020  8:15 AM Shae Del Toro APRN [facility] None         SHANTA Peters     EM[facility] Dragon/Transcription disclaimer:  Much of this encounter note is an electronic transcription of spoken language to printed text. Electronic transcription of spoken language may permit erroneous, or at times, nonsensical words or phrases to be inadvertently transcribed. Although I have reviewed the note for such errors, some may still exist.

## 2020-05-27 ENCOUNTER — OFFICE VISIT (OUTPATIENT)
Dept: PAIN MEDICINE | Facility: CLINIC | Age: 85
End: 2020-05-27

## 2020-05-27 DIAGNOSIS — M85.80 OSTEOPENIA, UNSPECIFIED LOCATION: ICD-10-CM

## 2020-05-27 DIAGNOSIS — Z74.09 IMPAIRED FUNCTIONAL MOBILITY, BALANCE, AND ENDURANCE: ICD-10-CM

## 2020-05-27 DIAGNOSIS — G56.81: Primary | ICD-10-CM

## 2020-05-27 DIAGNOSIS — M19.011 OSTEOARTHRITIS OF BOTH SHOULDERS, UNSPECIFIED OSTEOARTHRITIS TYPE: ICD-10-CM

## 2020-05-27 DIAGNOSIS — G56.81: ICD-10-CM

## 2020-05-27 DIAGNOSIS — Z91.81 AT HIGH RISK FOR FALLS: ICD-10-CM

## 2020-05-27 DIAGNOSIS — M19.012 OSTEOARTHRITIS OF BOTH SHOULDERS, UNSPECIFIED OSTEOARTHRITIS TYPE: ICD-10-CM

## 2020-05-27 DIAGNOSIS — M17.0 BILATERAL PRIMARY OSTEOARTHRITIS OF KNEE: ICD-10-CM

## 2020-05-27 DIAGNOSIS — M48.062 LUMBAR STENOSIS WITH NEUROGENIC CLAUDICATION: ICD-10-CM

## 2020-05-27 PROCEDURE — 99213 OFFICE O/P EST LOW 20 MIN: CPT | Performed by: NURSE PRACTITIONER

## 2020-05-28 ENCOUNTER — APPOINTMENT (OUTPATIENT)
Dept: PREADMISSION TESTING | Facility: HOSPITAL | Age: 85
End: 2020-05-28

## 2020-05-28 DIAGNOSIS — M48.062 LUMBAR STENOSIS WITH NEUROGENIC CLAUDICATION: Primary | ICD-10-CM

## 2020-05-28 PROCEDURE — U0004 COV-19 TEST NON-CDC HGH THRU: HCPCS

## 2020-05-28 PROCEDURE — U0002 COVID-19 LAB TEST NON-CDC: HCPCS

## 2020-05-28 PROCEDURE — C9803 HOPD COVID-19 SPEC COLLECT: HCPCS

## 2020-05-29 LAB
REF LAB TEST METHOD: NORMAL
SARS-COV-2 RNA RESP QL NAA+PROBE: NOT DETECTED

## 2020-06-01 ENCOUNTER — OUTSIDE FACILITY SERVICE (OUTPATIENT)
Dept: PAIN MEDICINE | Facility: CLINIC | Age: 85
End: 2020-06-01

## 2020-06-01 ENCOUNTER — APPOINTMENT (OUTPATIENT)
Dept: PREADMISSION TESTING | Facility: HOSPITAL | Age: 85
End: 2020-06-01

## 2020-06-01 ENCOUNTER — LAB REQUISITION (OUTPATIENT)
Dept: LAB | Facility: HOSPITAL | Age: 85
End: 2020-06-01

## 2020-06-01 DIAGNOSIS — M25.411 EFFUSION OF RIGHT SHOULDER JOINT: Primary | ICD-10-CM

## 2020-06-01 DIAGNOSIS — M25.419 EFFUSION, UNSPECIFIED SHOULDER: ICD-10-CM

## 2020-06-01 LAB
APPEARANCE FLD: ABNORMAL
COLOR FLD: ABNORMAL
CRYSTALS FLD MICRO: NORMAL
LYMPHOCYTES NFR FLD MANUAL: 28 %
MACROPHAGE FLUID: 24 %
MESOTHL CELL NFR FLD MANUAL: 25 %
MONOCYTES NFR FLD: 14 %
NEUTROPHILS NFR FLD MANUAL: 9 %
RBC # FLD AUTO: ABNORMAL /MM3
REF LAB TEST METHOD: NORMAL
SARS-COV-2 RNA RESP QL NAA+PROBE: NOT DETECTED
WBC # FLD AUTO: 615 /MM3

## 2020-06-01 PROCEDURE — 87075 CULTR BACTERIA EXCEPT BLOOD: CPT | Performed by: ANESTHESIOLOGY

## 2020-06-01 PROCEDURE — 89051 BODY FLUID CELL COUNT: CPT | Performed by: ANESTHESIOLOGY

## 2020-06-01 PROCEDURE — U0004 COV-19 TEST NON-CDC HGH THRU: HCPCS

## 2020-06-01 PROCEDURE — 87070 CULTURE OTHR SPECIMN AEROBIC: CPT | Performed by: ANESTHESIOLOGY

## 2020-06-01 PROCEDURE — C9803 HOPD COVID-19 SPEC COLLECT: HCPCS

## 2020-06-01 PROCEDURE — 88112 CYTOPATH CELL ENHANCE TECH: CPT | Performed by: ANESTHESIOLOGY

## 2020-06-01 PROCEDURE — U0002 COVID-19 LAB TEST NON-CDC: HCPCS

## 2020-06-01 PROCEDURE — 88305 TISSUE EXAM BY PATHOLOGIST: CPT | Performed by: ANESTHESIOLOGY

## 2020-06-01 PROCEDURE — 20610 DRAIN/INJ JOINT/BURSA W/O US: CPT | Performed by: ANESTHESIOLOGY

## 2020-06-01 PROCEDURE — 89060 EXAM SYNOVIAL FLUID CRYSTALS: CPT | Performed by: ANESTHESIOLOGY

## 2020-06-01 PROCEDURE — 64418 NJX AA&/STRD SPRSCAP NRV: CPT | Performed by: ANESTHESIOLOGY

## 2020-06-01 PROCEDURE — 99152 MOD SED SAME PHYS/QHP 5/>YRS: CPT | Performed by: ANESTHESIOLOGY

## 2020-06-01 PROCEDURE — 87205 SMEAR GRAM STAIN: CPT | Performed by: ANESTHESIOLOGY

## 2020-06-02 ENCOUNTER — TELEPHONE (OUTPATIENT)
Dept: PAIN MEDICINE | Facility: CLINIC | Age: 85
End: 2020-06-02

## 2020-06-02 LAB
LAB AP CASE REPORT: NORMAL
PATH REPORT.FINAL DX SPEC: NORMAL

## 2020-06-02 NOTE — TELEPHONE ENCOUNTER
diagnostic right suprascapular nerve block and right axillary nerve block 06/01/2020.    Attempted to contact patient. No answer, no option to leave message.  Patient is scheduled for a procedure with Dr. Alonzo on 06/03.

## 2020-06-03 ENCOUNTER — OUTSIDE FACILITY SERVICE (OUTPATIENT)
Dept: PAIN MEDICINE | Facility: CLINIC | Age: 85
End: 2020-06-03

## 2020-06-03 PROCEDURE — 76942 ECHO GUIDE FOR BIOPSY: CPT | Performed by: ANESTHESIOLOGY

## 2020-06-03 PROCEDURE — 64418 NJX AA&/STRD SPRSCAP NRV: CPT | Performed by: ANESTHESIOLOGY

## 2020-06-03 PROCEDURE — 99152 MOD SED SAME PHYS/QHP 5/>YRS: CPT | Performed by: ANESTHESIOLOGY

## 2020-06-04 ENCOUNTER — TELEPHONE (OUTPATIENT)
Dept: PAIN MEDICINE | Facility: CLINIC | Age: 85
End: 2020-06-04

## 2020-06-04 NOTE — TELEPHONE ENCOUNTER
Chaparrita replied.   She stated the patient is doing well.    She is aware that she will receive a call with information regarding her next appointment date.

## 2020-06-04 NOTE — TELEPHONE ENCOUNTER
second set of diagnostic right suprascapular nerve block and right axillary nerve block, 06/03/2020.    Attempted to contact patient.  No answer, no option to leave message.

## 2020-06-05 DIAGNOSIS — G56.81: Primary | ICD-10-CM

## 2020-06-06 LAB
BACTERIA FLD CULT: NORMAL
BACTERIA SPEC ANAEROBE CULT: NORMAL
GRAM STN SPEC: NORMAL

## 2020-06-12 ENCOUNTER — APPOINTMENT (OUTPATIENT)
Dept: PREADMISSION TESTING | Facility: HOSPITAL | Age: 85
End: 2020-06-12

## 2020-06-15 ENCOUNTER — OUTSIDE FACILITY SERVICE (OUTPATIENT)
Dept: PAIN MEDICINE | Facility: CLINIC | Age: 85
End: 2020-06-15

## 2020-06-15 PROCEDURE — 64640 INJECTION TREATMENT OF NERVE: CPT | Performed by: ANESTHESIOLOGY

## 2020-06-15 PROCEDURE — 99152 MOD SED SAME PHYS/QHP 5/>YRS: CPT | Performed by: ANESTHESIOLOGY

## 2020-06-16 ENCOUNTER — TELEPHONE (OUTPATIENT)
Dept: PAIN MEDICINE | Facility: CLINIC | Age: 85
End: 2020-06-16

## 2020-06-16 NOTE — TELEPHONE ENCOUNTER
bipolar radiofrequency ablation of the right suprascapular nerve and axillary nerve 06/15/2020.    Attempted to contact patient.  No answer, left message.    Appointment card placed in outgoing mail.

## 2020-07-09 ENCOUNTER — APPOINTMENT (OUTPATIENT)
Dept: OTHER | Facility: HOSPITAL | Age: 85
End: 2020-07-09

## 2020-07-09 ENCOUNTER — HOSPITAL ENCOUNTER (OUTPATIENT)
Dept: MAMMOGRAPHY | Facility: HOSPITAL | Age: 85
Discharge: HOME | End: 2020-07-09
Admitting: NURSE PRACTITIONER

## 2020-07-09 DIAGNOSIS — Z12.31 BREAST CANCER SCREENING BY MAMMOGRAM: ICD-10-CM

## 2020-07-09 PROCEDURE — 77067 SCR MAMMO BI INCL CAD: CPT

## 2020-07-09 PROCEDURE — 77063 BREAST TOMOSYNTHESIS BI: CPT | Performed by: RADIOLOGY

## 2020-07-09 PROCEDURE — 77063 BREAST TOMOSYNTHESIS BI: CPT

## 2020-07-09 PROCEDURE — 77067 SCR MAMMO BI INCL CAD: CPT | Performed by: RADIOLOGY

## 2020-07-13 ENCOUNTER — OFFICE VISIT (OUTPATIENT)
Dept: INTERNAL MEDICINE | Facility: CLINIC | Age: 85
End: 2020-07-13

## 2020-07-13 VITALS
OXYGEN SATURATION: 95 % | RESPIRATION RATE: 18 BRPM | TEMPERATURE: 98.7 F | DIASTOLIC BLOOD PRESSURE: 80 MMHG | SYSTOLIC BLOOD PRESSURE: 122 MMHG | HEART RATE: 85 BPM

## 2020-07-13 DIAGNOSIS — R53.83 FATIGUE, UNSPECIFIED TYPE: ICD-10-CM

## 2020-07-13 DIAGNOSIS — I63.9 CEREBROVASCULAR ACCIDENT (CVA), UNSPECIFIED MECHANISM (HCC): ICD-10-CM

## 2020-07-13 DIAGNOSIS — R42 DIZZY: ICD-10-CM

## 2020-07-13 DIAGNOSIS — I10 ESSENTIAL HYPERTENSION: Primary | ICD-10-CM

## 2020-07-13 PROCEDURE — 36415 COLL VENOUS BLD VENIPUNCTURE: CPT | Performed by: NURSE PRACTITIONER

## 2020-07-13 PROCEDURE — 84443 ASSAY THYROID STIM HORMONE: CPT | Performed by: NURSE PRACTITIONER

## 2020-07-13 PROCEDURE — 80053 COMPREHEN METABOLIC PANEL: CPT | Performed by: NURSE PRACTITIONER

## 2020-07-13 PROCEDURE — 99214 OFFICE O/P EST MOD 30 MIN: CPT | Performed by: NURSE PRACTITIONER

## 2020-07-13 PROCEDURE — 93000 ELECTROCARDIOGRAM COMPLETE: CPT | Performed by: NURSE PRACTITIONER

## 2020-07-13 PROCEDURE — 85025 COMPLETE CBC W/AUTO DIFF WBC: CPT | Performed by: NURSE PRACTITIONER

## 2020-07-13 NOTE — PATIENT INSTRUCTIONS
"DASH Eating Plan  DASH stands for \"Dietary Approaches to Stop Hypertension.\" The DASH eating plan is a healthy eating plan that has been shown to reduce high blood pressure (hypertension). It may also reduce your risk for type 2 diabetes, heart disease, and stroke. The DASH eating plan may also help with weight loss.  What are tips for following this plan?    General guidelines  · Avoid eating more than 2,300 mg (milligrams) of salt (sodium) a day. If you have hypertension, you may need to reduce your sodium intake to 1,500 mg a day.  · Limit alcohol intake to no more than 1 drink a day for nonpregnant women and 2 drinks a day for men. One drink equals 12 oz of beer, 5 oz of wine, or 1½ oz of hard liquor.  · Work with your health care provider to maintain a healthy body weight or to lose weight. Ask what an ideal weight is for you.  · Get at least 30 minutes of exercise that causes your heart to beat faster (aerobic exercise) most days of the week. Activities may include walking, swimming, or biking.  · Work with your health care provider or diet and nutrition specialist (dietitian) to adjust your eating plan to your individual calorie needs.  Reading food labels    · Check food labels for the amount of sodium per serving. Choose foods with less than 5 percent of the Daily Value of sodium. Generally, foods with less than 300 mg of sodium per serving fit into this eating plan.  · To find whole grains, look for the word \"whole\" as the first word in the ingredient list.  Shopping  · Buy products labeled as \"low-sodium\" or \"no salt added.\"  · Buy fresh foods. Avoid canned foods and premade or frozen meals.  Cooking  · Avoid adding salt when cooking. Use salt-free seasonings or herbs instead of table salt or sea salt. Check with your health care provider or pharmacist before using salt substitutes.  · Do not leblanc foods. Cook foods using healthy methods such as baking, boiling, grilling, and broiling instead.  · Cook with " heart-healthy oils, such as olive, canola, soybean, or sunflower oil.  Meal planning  · Eat a balanced diet that includes:  ? 5 or more servings of fruits and vegetables each day. At each meal, try to fill half of your plate with fruits and vegetables.  ? Up to 6-8 servings of whole grains each day.  ? Less than 6 oz of lean meat, poultry, or fish each day. A 3-oz serving of meat is about the same size as a deck of cards. One egg equals 1 oz.  ? 2 servings of low-fat dairy each day.  ? A serving of nuts, seeds, or beans 5 times each week.  ? Heart-healthy fats. Healthy fats called Omega-3 fatty acids are found in foods such as flaxseeds and coldwater fish, like sardines, salmon, and mackerel.  · Limit how much you eat of the following:  ? Canned or prepackaged foods.  ? Food that is high in trans fat, such as fried foods.  ? Food that is high in saturated fat, such as fatty meat.  ? Sweets, desserts, sugary drinks, and other foods with added sugar.  ? Full-fat dairy products.  · Do not salt foods before eating.  · Try to eat at least 2 vegetarian meals each week.  · Eat more home-cooked food and less restaurant, buffet, and fast food.  · When eating at a restaurant, ask that your food be prepared with less salt or no salt, if possible.  What foods are recommended?  The items listed may not be a complete list. Talk with your dietitian about what dietary choices are best for you.  Grains  Whole-grain or whole-wheat bread. Whole-grain or whole-wheat pasta. Brown rice. Oatmeal. Quinoa. Bulgur. Whole-grain and low-sodium cereals. Amalia bread. Low-fat, low-sodium crackers. Whole-wheat flour tortillas.  Vegetables  Fresh or frozen vegetables (raw, steamed, roasted, or grilled). Low-sodium or reduced-sodium tomato and vegetable juice. Low-sodium or reduced-sodium tomato sauce and tomato paste. Low-sodium or reduced-sodium canned vegetables.  Fruits  All fresh, dried, or frozen fruit. Canned fruit in natural juice (without  added sugar).  Meat and other protein foods  Skinless chicken or turkey. Ground chicken or turkey. Pork with fat trimmed off. Fish and seafood. Egg whites. Dried beans, peas, or lentils. Unsalted nuts, nut butters, and seeds. Unsalted canned beans. Lean cuts of beef with fat trimmed off. Low-sodium, lean deli meat.  Dairy  Low-fat (1%) or fat-free (skim) milk. Fat-free, low-fat, or reduced-fat cheeses. Nonfat, low-sodium ricotta or cottage cheese. Low-fat or nonfat yogurt. Low-fat, low-sodium cheese.  Fats and oils  Soft margarine without trans fats. Vegetable oil. Low-fat, reduced-fat, or light mayonnaise and salad dressings (reduced-sodium). Canola, safflower, olive, soybean, and sunflower oils. Avocado.  Seasoning and other foods  Herbs. Spices. Seasoning mixes without salt. Unsalted popcorn and pretzels. Fat-free sweets.  What foods are not recommended?  The items listed may not be a complete list. Talk with your dietitian about what dietary choices are best for you.  Grains  Baked goods made with fat, such as croissants, muffins, or some breads. Dry pasta or rice meal packs.  Vegetables  Creamed or fried vegetables. Vegetables in a cheese sauce. Regular canned vegetables (not low-sodium or reduced-sodium). Regular canned tomato sauce and paste (not low-sodium or reduced-sodium). Regular tomato and vegetable juice (not low-sodium or reduced-sodium). Pickles. Olives.  Fruits  Canned fruit in a light or heavy syrup. Fried fruit. Fruit in cream or butter sauce.  Meat and other protein foods  Fatty cuts of meat. Ribs. Fried meat. Juarez. Sausage. Bologna and other processed lunch meats. Salami. Fatback. Hotdogs. Bratwurst. Salted nuts and seeds. Canned beans with added salt. Canned or smoked fish. Whole eggs or egg yolks. Chicken or turkey with skin.  Dairy  Whole or 2% milk, cream, and half-and-half. Whole or full-fat cream cheese. Whole-fat or sweetened yogurt. Full-fat cheese. Nondairy creamers. Whipped toppings.  "Processed cheese and cheese spreads.  Fats and oils  Butter. Stick margarine. Lard. Shortening. Ghee. Juarez fat. Tropical oils, such as coconut, palm kernel, or palm oil.  Seasoning and other foods  Salted popcorn and pretzels. Onion salt, garlic salt, seasoned salt, table salt, and sea salt. Worcestershire sauce. Tartar sauce. Barbecue sauce. Teriyaki sauce. Soy sauce, including reduced-sodium. Steak sauce. Canned and packaged gravies. Fish sauce. Oyster sauce. Cocktail sauce. Horseradish that you find on the shelf. Ketchup. Mustard. Meat flavorings and tenderizers. Bouillon cubes. Hot sauce and Tabasco sauce. Premade or packaged marinades. Premade or packaged taco seasonings. Relishes. Regular salad dressings.  Where to find more information:  · National Heart, Lung, and Blood [place]: www.nhlbi.nih.gov  · American Heart Association: www.heart.org  Summary  · The DASH eating plan is a healthy eating plan that has been shown to reduce high blood pressure (hypertension). It may also reduce your risk for type 2 diabetes, heart disease, and stroke.  · With the DASH eating plan, you should limit salt (sodium) intake to 2,300 mg a day. If you have hypertension, you may need to reduce your sodium intake to 1,500 mg a day.  · When on the DASH eating plan, aim to eat more fresh fruits and vegetables, whole grains, lean proteins, low-fat dairy, and heart-healthy fats.  · Work with your health care provider or diet and nutrition specialist (dietitian) to adjust your eating plan to your individual calorie needs.  This information is not intended to replace advice given to you by your health care provider. Make sure you discuss any questions you have with your health care provider.  Document Released: 12/06/2012 Document Revised: 11/30/2018 Document Reviewed: 12/11/2017  Elsevier Patient Education © 2020 [employer]      DASH Eating Plan  DASH stands for \"Dietary Approaches to Stop Hypertension.\" The DASH eating plan is a " "healthy eating plan that has been shown to reduce high blood pressure (hypertension). It may also reduce your risk for type 2 diabetes, heart disease, and stroke. The DASH eating plan may also help with weight loss.  What are tips for following this plan?    General guidelines  · Avoid eating more than 2,300 mg (milligrams) of salt (sodium) a day. If you have hypertension, you may need to reduce your sodium intake to 1,500 mg a day.  · Limit alcohol intake to no more than 1 drink a day for nonpregnant women and 2 drinks a day for men. One drink equals 12 oz of beer, 5 oz of wine, or 1½ oz of hard liquor.  · Work with your health care provider to maintain a healthy body weight or to lose weight. Ask what an ideal weight is for you.  · Get at least 30 minutes of exercise that causes your heart to beat faster (aerobic exercise) most days of the week. Activities may include walking, swimming, or biking.  · Work with your health care provider or diet and nutrition specialist (dietitian) to adjust your eating plan to your individual calorie needs.  Reading food labels    · Check food labels for the amount of sodium per serving. Choose foods with less than 5 percent of the Daily Value of sodium. Generally, foods with less than 300 mg of sodium per serving fit into this eating plan.  · To find whole grains, look for the word \"whole\" as the first word in the ingredient list.  Shopping  · Buy products labeled as \"low-sodium\" or \"no salt added.\"  · Buy fresh foods. Avoid canned foods and premade or frozen meals.  Cooking  · Avoid adding salt when cooking. Use salt-free seasonings or herbs instead of table salt or sea salt. Check with your health care provider or pharmacist before using salt substitutes.  · Do not leblanc foods. Cook foods using healthy methods such as baking, boiling, grilling, and broiling instead.  · Cook with heart-healthy oils, such as olive, canola, soybean, or sunflower oil.  Meal planning  · Eat a balanced " diet that includes:  ? 5 or more servings of fruits and vegetables each day. At each meal, try to fill half of your plate with fruits and vegetables.  ? Up to 6-8 servings of whole grains each day.  ? Less than 6 oz of lean meat, poultry, or fish each day. A 3-oz serving of meat is about the same size as a deck of cards. One egg equals 1 oz.  ? 2 servings of low-fat dairy each day.  ? A serving of nuts, seeds, or beans 5 times each week.  ? Heart-healthy fats. Healthy fats called Omega-3 fatty acids are found in foods such as flaxseeds and coldwater fish, like sardines, salmon, and mackerel.  · Limit how much you eat of the following:  ? Canned or prepackaged foods.  ? Food that is high in trans fat, such as fried foods.  ? Food that is high in saturated fat, such as fatty meat.  ? Sweets, desserts, sugary drinks, and other foods with added sugar.  ? Full-fat dairy products.  · Do not salt foods before eating.  · Try to eat at least 2 vegetarian meals each week.  · Eat more home-cooked food and less restaurant, buffet, and fast food.  · When eating at a restaurant, ask that your food be prepared with less salt or no salt, if possible.  What foods are recommended?  The items listed may not be a complete list. Talk with your dietitian about what dietary choices are best for you.  Grains  Whole-grain or whole-wheat bread. Whole-grain or whole-wheat pasta. Brown rice. Oatmeal. Quinoa. Bulgur. Whole-grain and low-sodium cereals. Amalia bread. Low-fat, low-sodium crackers. Whole-wheat flour tortillas.  Vegetables  Fresh or frozen vegetables (raw, steamed, roasted, or grilled). Low-sodium or reduced-sodium tomato and vegetable juice. Low-sodium or reduced-sodium tomato sauce and tomato paste. Low-sodium or reduced-sodium canned vegetables.  Fruits  All fresh, dried, or frozen fruit. Canned fruit in natural juice (without added sugar).  Meat and other protein foods  Skinless chicken or turkey. Ground chicken or turkey. Pork  with fat trimmed off. Fish and seafood. Egg whites. Dried beans, peas, or lentils. Unsalted nuts, nut butters, and seeds. Unsalted canned beans. Lean cuts of beef with fat trimmed off. Low-sodium, lean deli meat.  Dairy  Low-fat (1%) or fat-free (skim) milk. Fat-free, low-fat, or reduced-fat cheeses. Nonfat, low-sodium ricotta or cottage cheese. Low-fat or nonfat yogurt. Low-fat, low-sodium cheese.  Fats and oils  Soft margarine without trans fats. Vegetable oil. Low-fat, reduced-fat, or light mayonnaise and salad dressings (reduced-sodium). Canola, safflower, olive, soybean, and sunflower oils. Avocado.  Seasoning and other foods  Herbs. Spices. Seasoning mixes without salt. Unsalted popcorn and pretzels. Fat-free sweets.  What foods are not recommended?  The items listed may not be a complete list. Talk with your dietitian about what dietary choices are best for you.  Grains  Baked goods made with fat, such as croissants, muffins, or some breads. Dry pasta or rice meal packs.  Vegetables  Creamed or fried vegetables. Vegetables in a cheese sauce. Regular canned vegetables (not low-sodium or reduced-sodium). Regular canned tomato sauce and paste (not low-sodium or reduced-sodium). Regular tomato and vegetable juice (not low-sodium or reduced-sodium). Pickles. Olives.  Fruits  Canned fruit in a light or heavy syrup. Fried fruit. Fruit in cream or butter sauce.  Meat and other protein foods  Fatty cuts of meat. Ribs. Fried meat. Juarez. Sausage. Bologna and other processed lunch meats. Salami. Fatback. Hotdogs. Bratwurst. Salted nuts and seeds. Canned beans with added salt. Canned or smoked fish. Whole eggs or egg yolks. Chicken or turkey with skin.  Dairy  Whole or 2% milk, cream, and half-and-half. Whole or full-fat cream cheese. Whole-fat or sweetened yogurt. Full-fat cheese. Nondairy creamers. Whipped toppings. Processed cheese and cheese spreads.  Fats and oils  Butter. Stick margarine. Lard. Shortening. Ghee.  Juarez fat. Tropical oils, such as coconut, palm kernel, or palm oil.  Seasoning and other foods  Salted popcorn and pretzels. Onion salt, garlic salt, seasoned salt, table salt, and sea salt. Worcestershire sauce. Tartar sauce. Barbecue sauce. Teriyaki sauce. Soy sauce, including reduced-sodium. Steak sauce. Canned and packaged gravies. Fish sauce. Oyster sauce. Cocktail sauce. Horseradish that you find on the shelf. Ketchup. Mustard. Meat flavorings and tenderizers. Bouillon cubes. Hot sauce and Tabasco sauce. Premade or packaged marinades. Premade or packaged taco seasonings. Relishes. Regular salad dressings.  Where to find more information:  · National Heart, Lung, and Blood [place]: www.nhlbi.nih.gov  · American Heart Association: www.heart.org  Summary  · The DASH eating plan is a healthy eating plan that has been shown to reduce high blood pressure (hypertension). It may also reduce your risk for type 2 diabetes, heart disease, and stroke.  · With the DASH eating plan, you should limit salt (sodium) intake to 2,300 mg a day. If you have hypertension, you may need to reduce your sodium intake to 1,500 mg a day.  · When on the DASH eating plan, aim to eat more fresh fruits and vegetables, whole grains, lean proteins, low-fat dairy, and heart-healthy fats.  · Work with your health care provider or diet and nutrition specialist (dietitian) to adjust your eating plan to your individual calorie needs.  This information is not intended to replace advice given to you by your health care provider. Make sure you discuss any questions you have with your health care provider.  Document Released: 12/06/2012 Document Revised: 11/30/2018 Document Reviewed: 12/11/2017  Elsevier Patient Education © 2020 [employer] Inc[employer]

## 2020-07-13 NOTE — PROGRESS NOTES
Chief Complaint   Patient presents with   • Hypertension     had some dizziness and bp was 150/90    • Sleeping Problem     x 4 days abnormally drowsy in the morning   • Spots and/or Floaters     arms and legs was taking keflex 500mg twice a day         Subjective     History of Present Illness   The pt is with daughter     The patient has had drowsiness/fatigue  in am and then resolved-occurred over the last 3-4 d but was better today    Then dizzy today 2 days and thought she may fall then resolved  Dizziness is feeting    She has wrist BP cuff but ordered a bicep cuff  She did not check bp then  She had taken bp meds and within the first 2 hours of meds felt dizzy.  No CP    She does not drink very well  She may have felt slight disorientation piror    pmh uti and on kelfex qd  Usually does nto have any    Red spots on BFA and on keflex 1000 daily and new red spots itching slighlty and thought was on kelfex    No GI symptoms    pmh CVA      The following portions of the patient's history were reviewed and updated as appropriate: allergies, current medications, past family history, past medical history, past social history, past surgical history and problem list.      Current Outpatient Medications:   •  acetaminophen (TYLENOL) 500 MG tablet, Take 500 mg by mouth Every 6 (Six) Hours As Needed for Mild Pain ., Disp: , Rfl:   •  amLODIPine (NORVASC) 10 MG tablet, Take 1 tablet by mouth Daily., Disp: 90 tablet, Rfl: 1  •  aspirin 81 MG chewable tablet, Chew 81 mg Daily., Disp: , Rfl:   •  benazepril (LOTENSIN) 40 MG tablet, Take 1 tablet by mouth Daily., Disp: 90 tablet, Rfl: 1  •  diclofenac (VOLTAREN) 1 % gel gel, Apply 4 g topically to the appropriate area as directed 4 (Four) Times a Day., Disp: 100 g, Rfl: 12  •  Dietary Management Product (RHEUMATE) capsule, Take 1 capsule twice daily, Disp: 180 capsule, Rfl: 1  •  dorzolamide-timolol (COSOPT) 22.3-6.8 MG/ML ophthalmic solution, Administer 1 drop to both eyes 2  (Two) Times a Day., Disp: , Rfl:   •  famotidine (PEPCID) 40 MG tablet, Take 1 tablet by mouth once daily, Disp: 90 tablet, Rfl: 0  •  ibuprofen (ADVIL,MOTRIN) 200 MG tablet, Take 200 mg by mouth Every 6 (Six) Hours As Needed for Mild Pain ., Disp: , Rfl:   •  metoprolol succinate XL (TOPROL-XL) 50 MG 24 hr tablet, Take 1 tablet by mouth Daily., Disp: 90 tablet, Rfl: 1  •  Multiple Vitamins-Minerals (CENTRUM SILVER PO), Take  by mouth., Disp: , Rfl:   •  Multiple Vitamins-Minerals (PRESERVISION AREDS PO), Take 1 tablet by mouth 2 (Two) Times a Day., Disp: , Rfl:   •  nitrofurantoin, macrocrystal-monohydrate, (Macrobid) 100 MG capsule, Take 1 capsule by mouth 2 (Two) Times a Day., Disp: 14 capsule, Rfl: 0    Vitals:    07/13/20 1555   BP: 122/80   Pulse: 85   Resp: 18   Temp: 98.7 °F (37.1 °C)   SpO2: 95%       There is no height or weight on file to calculate BMI.        Review of Systems   Constitutional: Positive for fatigue. Negative for activity change, appetite change, chills and fever.   HENT: Negative for congestion, postnasal drip and sore throat.    Eyes: Negative for visual disturbance.   Respiratory: Negative for cough and shortness of breath.    Cardiovascular: Negative for chest pain, palpitations and leg swelling.   Gastrointestinal: Negative for abdominal pain, constipation, diarrhea, nausea and GERD.   Musculoskeletal: Negative for arthralgias and myalgias.   Skin: Positive for rash.   Allergic/Immunologic: Negative for environmental allergies.   Neurological: Positive for light-headedness. Negative for dizziness.   Psychiatric/Behavioral: Negative for sleep disturbance.   All other systems reviewed and are negative.      Objective   Physical Exam   Constitutional: She is oriented to person, place, and time. She appears well-developed and well-nourished.   HENT:   Head: Normocephalic and atraumatic.   Right Ear: External ear normal.   Left Ear: External ear normal.   Nose: Nose normal.    Mouth/Throat: Oropharynx is clear and moist.   Neck: No thyromegaly present.   Cardiovascular: Normal rate, regular rhythm and intact distal pulses.   Pulmonary/Chest: Effort normal and breath sounds normal.   Abdominal: Soft. Bowel sounds are normal. She exhibits no distension. There is no tenderness.   Musculoskeletal: She exhibits no edema.   Lymphadenopathy:     She has no cervical adenopathy.   Neurological: She is alert and oriented to person, place, and time.   Skin: Capillary refill takes 2 to 3 seconds.   Psychiatric: She has a normal mood and affect. Her behavior is normal.   Nursing note and vitals reviewed.             Assessment/Plan   Anita was seen today for hypertension, sleeping problem and spots and/or floaters.    Diagnoses and all orders for this visit:    Essential hypertension  -     Cancel: POC Urinalysis Dipstick, Automated  -     CBC & Differential  -     Comprehensive Metabolic Panel  -     TSH  -     CBC Auto Differential    Cerebrovascular accident (CVA), unspecified mechanism (CMS/HCC)  -     Cancel: POC Urinalysis Dipstick, Automated  -     CBC & Differential  -     Comprehensive Metabolic Panel  -     TSH  -     CBC Auto Differential    Dizzy  -     ECG 12 Lead  -     Cancel: POC Urinalysis Dipstick, Automated  -     CBC & Differential  -     Comprehensive Metabolic Panel  -     TSH  -     CBC Auto Differential    Fatigue, unspecified type  -     Cancel: POC Urinalysis Dipstick, Automated  -     CBC & Differential  -     Comprehensive Metabolic Panel  -     TSH  -     CBC Auto Differential    Monitor blood pressure with breath    ECG 12 Lead  Date/Time: 2020 4:22 PM  Performed by: Shae Del Toro APRN  Authorized by: Shae Del Toro APRN   Comparison: compared with previous ECG   Comparison to previous EC20  L axis deviation with pvc  Rhythm: sinus rhythm  Rate: normal  Conduction: conduction normal  ST Segments: ST segments normal  T Waves: T waves normal  QRS axis:  left    Clinical impression: non-specific ECG               Return in about 1 week (around 7/20/2020).  RTC/call  If symptoms worsen  Meds MOA and SE's reviewed and pt v/u

## 2020-07-14 ENCOUNTER — LAB (OUTPATIENT)
Dept: INTERNAL MEDICINE | Facility: CLINIC | Age: 85
End: 2020-07-14

## 2020-07-14 DIAGNOSIS — R82.998 LEUKOCYTES IN URINE: ICD-10-CM

## 2020-07-14 DIAGNOSIS — N39.0 FREQUENT UTI: Primary | ICD-10-CM

## 2020-07-14 LAB
ALBUMIN SERPL-MCNC: 4.3 G/DL (ref 3.5–5.2)
ALBUMIN/GLOB SERPL: 1.2 G/DL
ALP SERPL-CCNC: 69 U/L (ref 39–117)
ALT SERPL W P-5'-P-CCNC: 15 U/L (ref 1–33)
ANION GAP SERPL CALCULATED.3IONS-SCNC: 15.6 MMOL/L (ref 5–15)
AST SERPL-CCNC: 18 U/L (ref 1–32)
BASOPHILS # BLD AUTO: 0.02 10*3/MM3 (ref 0–0.2)
BASOPHILS NFR BLD AUTO: 0.4 % (ref 0–1.5)
BILIRUB BLD-MCNC: NEGATIVE MG/DL
BILIRUB SERPL-MCNC: <0.2 MG/DL (ref 0–1.2)
BUN SERPL-MCNC: 16 MG/DL (ref 8–23)
BUN/CREAT SERPL: 27.6 (ref 7–25)
CALCIUM SPEC-SCNC: 9.6 MG/DL (ref 8.2–9.6)
CHLORIDE SERPL-SCNC: 100 MMOL/L (ref 98–107)
CLARITY, POC: ABNORMAL
CO2 SERPL-SCNC: 23.4 MMOL/L (ref 22–29)
COLOR UR: YELLOW
CREAT SERPL-MCNC: 0.58 MG/DL (ref 0.57–1)
DEPRECATED RDW RBC AUTO: 41.3 FL (ref 37–54)
EOSINOPHIL # BLD AUTO: 0.13 10*3/MM3 (ref 0–0.4)
EOSINOPHIL NFR BLD AUTO: 2.6 % (ref 0.3–6.2)
ERYTHROCYTE [DISTWIDTH] IN BLOOD BY AUTOMATED COUNT: 11.8 % (ref 12.3–15.4)
EXPIRATION DATE: ABNORMAL
GFR SERPL CREATININE-BSD FRML MDRD: 97 ML/MIN/1.73
GLOBULIN UR ELPH-MCNC: 3.5 GM/DL
GLUCOSE SERPL-MCNC: 88 MG/DL (ref 65–99)
GLUCOSE UR STRIP-MCNC: NEGATIVE MG/DL
HCT VFR BLD AUTO: 37 % (ref 34–46.6)
HGB BLD-MCNC: 12.7 G/DL (ref 12–15.9)
IMM GRANULOCYTES # BLD AUTO: 0.02 10*3/MM3 (ref 0–0.05)
IMM GRANULOCYTES NFR BLD AUTO: 0.4 % (ref 0–0.5)
KETONES UR QL: NEGATIVE
LEUKOCYTE EST, POC: ABNORMAL
LYMPHOCYTES # BLD AUTO: 0.99 10*3/MM3 (ref 0.7–3.1)
LYMPHOCYTES NFR BLD AUTO: 19.8 % (ref 19.6–45.3)
Lab: ABNORMAL
MCH RBC QN AUTO: 33 PG (ref 26.6–33)
MCHC RBC AUTO-ENTMCNC: 34.3 G/DL (ref 31.5–35.7)
MCV RBC AUTO: 96.1 FL (ref 79–97)
MONOCYTES # BLD AUTO: 0.71 10*3/MM3 (ref 0.1–0.9)
MONOCYTES NFR BLD AUTO: 14.2 % (ref 5–12)
NEUTROPHILS NFR BLD AUTO: 3.13 10*3/MM3 (ref 1.7–7)
NEUTROPHILS NFR BLD AUTO: 62.6 % (ref 42.7–76)
NITRITE UR-MCNC: NEGATIVE MG/ML
NRBC BLD AUTO-RTO: 0 /100 WBC (ref 0–0.2)
PH UR: 8 [PH] (ref 5–8)
PLATELET # BLD AUTO: 274 10*3/MM3 (ref 140–450)
PMV BLD AUTO: 9.6 FL (ref 6–12)
POTASSIUM SERPL-SCNC: 4.5 MMOL/L (ref 3.5–5.2)
PROT SERPL-MCNC: 7.8 G/DL (ref 6–8.5)
PROT UR STRIP-MCNC: NEGATIVE MG/DL
RBC # BLD AUTO: 3.85 10*6/MM3 (ref 3.77–5.28)
RBC # UR STRIP: ABNORMAL /UL
SODIUM SERPL-SCNC: 139 MMOL/L (ref 136–145)
SP GR UR: 1.01 (ref 1–1.03)
TSH SERPL DL<=0.05 MIU/L-ACNC: 3.52 UIU/ML (ref 0.27–4.2)
UROBILINOGEN UR QL: NORMAL
WBC # BLD AUTO: 5 10*3/MM3 (ref 3.4–10.8)

## 2020-07-14 PROCEDURE — 81003 URINALYSIS AUTO W/O SCOPE: CPT | Performed by: NURSE PRACTITIONER

## 2020-07-14 PROCEDURE — 87086 URINE CULTURE/COLONY COUNT: CPT | Performed by: NURSE PRACTITIONER

## 2020-07-15 LAB — BACTERIA SPEC AEROBE CULT: NO GROWTH

## 2020-07-15 RX ORDER — NITROFURANTOIN 25; 75 MG/1; MG/1
100 CAPSULE ORAL 2 TIMES DAILY
Qty: 14 CAPSULE | Refills: 0 | Status: SHIPPED | OUTPATIENT
Start: 2020-07-15 | End: 2020-08-06

## 2020-07-15 RX ORDER — CEPHALEXIN 500 MG/1
500 CAPSULE ORAL 3 TIMES DAILY
Qty: 30 CAPSULE | Refills: 0 | Status: SHIPPED | OUTPATIENT
Start: 2020-07-15 | End: 2020-07-15

## 2020-07-22 ENCOUNTER — OFFICE VISIT (OUTPATIENT)
Dept: INTERNAL MEDICINE | Facility: CLINIC | Age: 85
End: 2020-07-22

## 2020-07-22 VITALS
BODY MASS INDEX: 21.19 KG/M2 | WEIGHT: 108.5 LBS | HEART RATE: 76 BPM | DIASTOLIC BLOOD PRESSURE: 70 MMHG | OXYGEN SATURATION: 97 % | TEMPERATURE: 99.1 F | SYSTOLIC BLOOD PRESSURE: 120 MMHG | RESPIRATION RATE: 16 BRPM

## 2020-07-22 DIAGNOSIS — T14.8XXA BRUISING: ICD-10-CM

## 2020-07-22 DIAGNOSIS — R42 DIZZINESS: Primary | ICD-10-CM

## 2020-07-22 PROCEDURE — 99213 OFFICE O/P EST LOW 20 MIN: CPT | Performed by: NURSE PRACTITIONER

## 2020-07-22 NOTE — PROGRESS NOTES
Chief Complaint   Patient presents with   • Dizziness     1 week fu/ brusing on rt arm        Subjective     History of Present Illness   Patient is here today for follow-up  Patient was seen for dizziness and fatigue.  Thought she may have a UTI due to leukocytes noted on urine dip.  Patient was started antibiotic however the culture was negative.  Patient's patient reports it is been going on for the last week.  She is concerned initially she may be dehydrated.  TSH CMP CBC are all normal.  Patient's dizziness has resolved at this time.  She still feels mildly fatigued but overall improved.  She does have concerns with areas noted on her arms bilaterally.            The following portions of the patient's history were reviewed and updated as appropriate: allergies, current medications, past family history, past medical history, past social history, past surgical history and problem list.      Current Outpatient Medications:   •  acetaminophen (TYLENOL) 500 MG tablet, Take 500 mg by mouth Every 6 (Six) Hours As Needed for Mild Pain ., Disp: , Rfl:   •  amLODIPine (NORVASC) 10 MG tablet, Take 1 tablet by mouth Daily., Disp: 90 tablet, Rfl: 1  •  aspirin 81 MG chewable tablet, Chew 81 mg Daily., Disp: , Rfl:   •  benazepril (LOTENSIN) 40 MG tablet, Take 1 tablet by mouth Daily., Disp: 90 tablet, Rfl: 1  •  diclofenac (VOLTAREN) 1 % gel gel, Apply 4 g topically to the appropriate area as directed 4 (Four) Times a Day., Disp: 100 g, Rfl: 12  •  Dietary Management Product (RHEUMATE) capsule, Take 1 capsule twice daily, Disp: 180 capsule, Rfl: 1  •  dorzolamide-timolol (COSOPT) 22.3-6.8 MG/ML ophthalmic solution, Administer 1 drop to both eyes 2 (Two) Times a Day., Disp: , Rfl:   •  famotidine (PEPCID) 40 MG tablet, Take 1 tablet by mouth once daily, Disp: 90 tablet, Rfl: 0  •  ibuprofen (ADVIL,MOTRIN) 200 MG tablet, Take 200 mg by mouth Every 6 (Six) Hours As Needed for Mild Pain ., Disp: , Rfl:   •  metoprolol  succinate XL (TOPROL-XL) 50 MG 24 hr tablet, Take 1 tablet by mouth Daily., Disp: 90 tablet, Rfl: 1  •  Multiple Vitamins-Minerals (CENTRUM SILVER PO), Take  by mouth., Disp: , Rfl:   •  Multiple Vitamins-Minerals (PRESERVISION AREDS PO), Take 1 tablet by mouth 2 (Two) Times a Day., Disp: , Rfl:   •  nitrofurantoin, macrocrystal-monohydrate, (Macrobid) 100 MG capsule, Take 1 capsule by mouth 2 (Two) Times a Day., Disp: 14 capsule, Rfl: 0    Vitals:    07/22/20 1407   BP: 120/70   Pulse: 76   Resp: 16   Temp: 99.1 °F (37.3 °C)   SpO2: 97%       Body mass index is 21.19 kg/m².        Review of Systems   Constitutional: Negative for activity change, appetite change, chills, fatigue and fever.   HENT: Negative for congestion, ear pain, postnasal drip, sinus pressure and sore throat.    Eyes: Negative for visual disturbance.   Respiratory: Negative for cough and shortness of breath.    Cardiovascular: Negative for chest pain and leg swelling.   Gastrointestinal: Negative for abdominal pain, constipation, diarrhea and nausea.   Genitourinary: Negative for dysuria.   Musculoskeletal: Negative for arthralgias.   Skin: Negative for rash.   Neurological: Negative for dizziness and headache.   Hematological: Bruises/bleeds easily.   Psychiatric/Behavioral: Negative for sleep disturbance.       Objective   Physical Exam   Constitutional: She is oriented to person, place, and time. She appears well-developed and well-nourished.   HENT:   Head: Normocephalic and atraumatic.   Cardiovascular: Normal rate and regular rhythm.   Pulmonary/Chest: Effort normal and breath sounds normal.   Abdominal: Soft. Bowel sounds are normal.   Neurological: She is alert and oriented to person, place, and time.   Skin: Skin is warm and dry. Capillary refill takes 2 to 3 seconds.   Small 1 cm area sparse 2-3 on each arm forearm bilaterally most likely where she can bump her forearms.  Areas are bruising   Psychiatric: She has a normal mood and  affect. Her behavior is normal.   Nursing note and vitals reviewed.            Assessment/Plan   Anita was seen today for dizziness.    Diagnoses and all orders for this visit:    Dizziness    Bruising        Dizziness improved if recurs she will let us know continue to stay well-hydrated which may likely of aggravated her dizziness.  Reassurance small bruising on arms related to her medications.  No follow-ups on file.  RTC/call  If symptoms worsen  Meds MOA and SE's reviewed and pt v/u

## 2020-08-06 ENCOUNTER — OFFICE VISIT (OUTPATIENT)
Dept: INTERNAL MEDICINE | Facility: CLINIC | Age: 85
End: 2020-08-06

## 2020-08-06 VITALS
OXYGEN SATURATION: 97 % | HEART RATE: 77 BPM | WEIGHT: 108 LBS | DIASTOLIC BLOOD PRESSURE: 68 MMHG | TEMPERATURE: 98.7 F | BODY MASS INDEX: 21.09 KG/M2 | SYSTOLIC BLOOD PRESSURE: 122 MMHG

## 2020-08-06 DIAGNOSIS — H61.22 IMPACTED CERUMEN OF LEFT EAR: Primary | ICD-10-CM

## 2020-08-06 PROCEDURE — 99213 OFFICE O/P EST LOW 20 MIN: CPT | Performed by: PHYSICIAN ASSISTANT

## 2020-08-06 PROCEDURE — 69210 REMOVE IMPACTED EAR WAX UNI: CPT | Performed by: PHYSICIAN ASSISTANT

## 2020-08-06 RX ORDER — LATANOPROST 50 UG/ML
1 SOLUTION/ DROPS OPHTHALMIC NIGHTLY
COMMUNITY

## 2020-08-06 NOTE — PROGRESS NOTES
Follow Up Office Visit      Patient Name: Anita Cook    Chief Complaint:    Chief Complaint   Patient presents with   • Ear Problem     Lt ear. dark lining        History of Present Illness: Anita Cook is a 89 y.o. female  here for c/o L ear muffled, popping, feeling full x 7 days. Her daughter accompanies her and tried two days of ear drops and syringe but not successful. Denies tinnitus, ear bleeding or d/c., HA    Review of Systems:   Review of Systems   Constitutional: Negative for chills and fever.   HENT: Positive for hearing loss. Negative for ear discharge, ear pain, facial swelling, sinus pressure, tinnitus, trouble swallowing and voice change.    Eyes: Negative for visual disturbance.   Respiratory: Negative for cough and shortness of breath.    Cardiovascular: Negative for chest pain and leg swelling.   Neurological: Negative for syncope, facial asymmetry, speech difficulty, light-headedness, numbness and headache.   Psychiatric/Behavioral: Negative for sleep disturbance.       PMH, PSH, Problem List, Meds and Allergies as well as Care Team reviewed and updated as appropriate.    Allergies:   Allergies   Allergen Reactions   • Bactrim [Sulfamethoxazole-Trimethoprim] Rash   • Keflex [Cephalexin] Rash     Questionable allergy to Keflex       Physical Exam:  Vital Signs:   Vitals:    08/06/20 1512   BP: 122/68   BP Location: Left arm   Patient Position: Sitting   Cuff Size: Adult   Pulse: 77   Temp: 98.7 °F (37.1 °C)   SpO2: 97%   Weight: 49 kg (108 lb)   PainSc: 0-No pain     Physical Exam   Constitutional: She is oriented to person, place, and time.   Elderly female present in , [facility], answers questions readily, pleasant and cooperative. In NAD.   HENT:   Head: Normocephalic and atraumatic.   Right Ear: External ear normal.   Left Ear: External ear normal.   Nose: Nose normal.   Mouth/Throat: Oropharynx is clear and moist.   No motion tenderness L ear. EAC is completely occluded with  cerumen L but her R EAC is clear and TM light reflective and translucent   Eyes: Conjunctivae are normal.   Neck: Normal range of motion.   Cardiovascular: Normal rate and regular rhythm.   Pulmonary/Chest: Effort normal.   Lymphadenopathy:     She has no cervical adenopathy.   Neurological: She is alert and oriented to person, place, and time.   Psychiatric: She has a normal mood and affect. Her behavior is normal. Judgment and thought content normal.   Nursing note and vitals reviewed.      Ear Cerumen Removal  Date/Time: 8/6/2020 4:16 PM  Performed by: Ana Angulo PA  Authorized by: Ana Angulo PA   Consent: Verbal consent obtained.  Risks and benefits: risks, benefits and alternatives were discussed  Consent given by: patient  Patient understanding: patient states understanding of the procedure being performed  Patient consent: the patient's understanding of the procedure matches consent given    Anesthesia:  Local Anesthetic: none  Ceruminolytics applied: Ceruminolytics applied prior to the procedure.  Location details: left ear  Patient tolerance: Patient tolerated the procedure well with no immediate complications  Comments: Successfully cleared ear completely and light reflective TM visualized- no trauma to EAC.   Procedure type: instrumentation and irrigation   Sedation:  Patient sedated: no            Anita was seen today for ear problem.    Diagnoses and all orders for this visit:    Impacted cerumen of left ear  -     Ear Cerumen Removal           No follow-ups on file.    Discussed options for and developed POC with pt, risks/benefits, and all questions answered.     CLARK Angulo PA-C  [facility]  Internal Medicine and Pediatrics- [place] [phone]    Please note that portions of this note may have been completed with a voice recognition program. Efforts were made to edit the dictations, but occasionally words  are mistranscribed.

## 2020-08-25 ENCOUNTER — OFFICE VISIT (OUTPATIENT)
Dept: INTERNAL MEDICINE | Facility: CLINIC | Age: 85
End: 2020-08-25

## 2020-08-25 VITALS
RESPIRATION RATE: 18 BRPM | TEMPERATURE: 98 F | BODY MASS INDEX: 21.09 KG/M2 | DIASTOLIC BLOOD PRESSURE: 78 MMHG | OXYGEN SATURATION: 96 % | WEIGHT: 108 LBS | SYSTOLIC BLOOD PRESSURE: 152 MMHG | HEART RATE: 80 BPM

## 2020-08-25 DIAGNOSIS — M19.042 PRIMARY OSTEOARTHRITIS OF LEFT HAND: ICD-10-CM

## 2020-08-25 DIAGNOSIS — R31.0 GROSS HEMATURIA: Primary | ICD-10-CM

## 2020-08-25 PROBLEM — Z85.3 HISTORY OF BREAST CANCER: Status: RESOLVED | Noted: 2019-09-04 | Resolved: 2020-08-25

## 2020-08-25 PROBLEM — M25.562 CHRONIC PAIN OF BOTH KNEES: Status: RESOLVED | Noted: 2019-10-01 | Resolved: 2020-08-25
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
## 2020-08-25 PROBLEM — M25.561 CHRONIC PAIN OF BOTH KNEES: Status: RESOLVED | Noted: 2019-10-01 | Resolved: 2020-08-25

## 2020-08-25 PROBLEM — L82.1 SK (SEBORRHEIC KERATOSIS): Status: RESOLVED | Noted: 2020-02-22 | Resolved: 2020-08-25

## 2020-08-25 PROBLEM — H35.3190 NONEXUDATIVE AGE-RELATED MACULAR DEGENERATION: Status: ACTIVE | Noted: 2020-02-03

## 2020-08-25 PROBLEM — G89.29 CHRONIC PAIN OF BOTH KNEES: Status: RESOLVED | Noted: 2019-10-01 | Resolved: 2020-08-25

## 2020-08-25 PROCEDURE — 99213 OFFICE O/P EST LOW 20 MIN: CPT | Performed by: PHYSICIAN ASSISTANT

## 2020-08-25 RX ORDER — FAMOTIDINE 40 MG/1
40 TABLET, FILM COATED ORAL NIGHTLY
Qty: 90 TABLET | Refills: 0 | Status: SHIPPED | OUTPATIENT
Start: 2020-08-25 | End: 2020-11-18 | Stop reason: SDUPTHER

## 2020-08-25 RX ORDER — CEPHALEXIN 250 MG/1
250 CAPSULE ORAL DAILY
COMMUNITY
Start: 2020-07-23 | End: 2020-11-16

## 2020-08-25 NOTE — PROGRESS NOTES
Follow Up Office Visit      Patient Name: Anita Cook    Chief Complaint:    Chief Complaint   Patient presents with   • Hand Pain     Lt. ibuprophen   • Blood in Urine       History of Present Illness: Anita Cook is a 89 y.o. female  here for concerns her daughter has and brought her Mom in to be seen. Mrs Cook is unconcerned about the hand because absolutely no pain with use of it or loss of fine motor function but she is agreeable to having it assessed by provider.   1. Has noticed that her urine is orange in the am x 3 days. There hasn't been any dysuria, Also noted some blood on toilet paper- scant,hx hmorrhoids. Admits slight constipation lately.     2. L hand feels a little stiff, sore, felt a pop and then a little swelling but has not had any loss of ROM or pain with use. Hx of chronic pain and arthritis in hands- dx OA    3. Gerd- a little indigestion, no nausea or vomiting.         Review of Systems   Constitutional: Negative for diaphoresis, fatigue and fever.   HENT: Negative for tinnitus and trouble swallowing.    Eyes: Negative for photophobia, redness and visual disturbance.   Gastrointestinal: Positive for constipation and GERD. Negative for nausea and vomiting.   Genitourinary: Negative for pelvic pain.   Musculoskeletal: Negative for arthralgias.   Skin: Negative for rash and skin lesions.   Neurological: Negative for dizziness, speech difficulty, light-headedness, numbness and headache.   Hematological: Does not bruise/bleed easily.   Psychiatric/Behavioral: Negative for sleep disturbance and suicidal ideas.       PMH, Surgical, Meds and Allergies reviewed and updated as well as Care Team as appropriate    Allergies:   Allergies   Allergen Reactions   • Bactrim [Sulfamethoxazole-Trimethoprim] Rash       Objective     Vital Signs:   Vitals:    08/25/20 1546   BP: 152/78   Pulse: 80   Resp: 18   Temp: 98 °F (36.7 °C)   SpO2: 96%     Physical Exam   Constitutional: She is  oriented to person, place, and time. She appears well-developed and well-nourished.   HENT:   Head: Normocephalic and atraumatic.   Neck: Normal range of motion. Neck supple. No thyromegaly present.   Cardiovascular: Normal rate, regular rhythm and normal heart sounds.   Pulmonary/Chest: Effort normal and breath sounds normal.   Abdominal: Soft. Bowel sounds are normal. She exhibits no distension. There is no tenderness.   No cVAT   Musculoskeletal:   There are extensive degenerative changes evident in glenna hands with subluxation of bayron eof her MCPS, PIPs and DIPS, and there is an acute appearing joint effusion overlying the left 2nd MCP dorsal aspect but no pain with palpation or loss of ROM compared to R hand. No erythema or warmth of the area. No bruising.    Lymphadenopathy:     She has no cervical adenopathy.   Neurological: She is alert and oriented to person, place, and time.   Skin: Skin is warm.   Psychiatric: She has a normal mood and affect. Her behavior is normal. Judgment and thought content normal.   Nursing note and vitals reviewed.        Assessment / Plan      Anita was seen today for hand pain, blood in urine and hypertension.    Diagnoses and all orders for this visit:    Gross hematuria  -     -     Urinalysis, Microscopic Only - Urine, Clean Catch; Future  -     Urine Culture - Urine, Urine, Clean Catch; Future  Supplies given for collection at home.     -    Primary osteoarthritis of left hand   Options discussed including xrays ,splints, risks/benefits. Given no clinical evidence of fracture, pt age and current Covid pandemic then I am hesitant to send her for xrays. Family will obtain a carpal tunnel splint as discussed and pt to wear at bedtime and if it provides support then part of the day, She declines ace wrap as too bothersome without enough support anyway. If hand become symptomatic in function or if painful then they will f/u here.     GERD:   -     famotidine (PEPCID) 40 MG tablet;  Take 1 tablet by mouth Every Night.     Hemorrhoids: docusate or fiber to ensure no constipation prn. F/u if sx develop. Pt declnes hemorrhoid cream or suppository as was a transient light pink coloring only one time on tisse.          Discussed options for and developed POC with pt for diagnoses or problems addressed today, risks/benefits, and all questions answered. Pt and daughter voice understanding. Increase water. They will provide a sample to us as pt is unable to void at this time.   Follow Up:   Return if symptoms worsen or fail to improve.        Addenda following UA collection and results:   Results Review:    Urine Culture   Date Value Ref Range Status   08/26/2020 No growth  Final            Component  Ref Range & Units 4d ago   Color  Yellow, Straw, Dark Yellow, Carisa Yellow    Clarity, UA  Clear CloudyAbnormal     Specific Gravity   1.005 - 1.030 1.010    pH, Urine  5.0 - 8.0 9.0Abnormal     Leukocytes  Negative 75 [address] Marie/ulAbnormal     Nitrite, UA  Negative Negative    Protein, POC  Negative mg/dL Negative    Glucose, UA  Negative, 1000 mg/dL (3+) mg/dL Negative    Ketones, UA  Negative Negative    Urobilinogen, UA  Normal Normal    Bilirubin  Negative Negative    Blood, UA  Negative 250 Ramesh/ulAbnormal     Lot Number [id number]    Expiration Date 2-28-21    Resulting [facility]ORAT[facility]         Specimen Collected: 08/26/20 08:59   Last Resulted: 08/26/20 08:59   Lab Flowsheet Order Details View Encounter Lab and Collection Details Routing Result History              Other Results from 8/26/2020     Result Notes for Urine Culture - Urine, Urine, Clean Catch     Notes recorded by Ana Angulo PA on 8/27/2020 at 1:19 PM EDT  Please let pt and daughter know that although there was some bacteria noted that can be a contaminant from skin and there were no RBC or WBC and culture was negative.   [place]            Component  Ref Range & Units 4d ago   RBC, UA  None Seen, 0-2 /HPF 0-2     WBC, UA  None Seen, 0-2 /HPF 0-2    Bacteria, UA  None Seen /HPF 2+Abnormal     Squamous Epithelial Cells, UA  None Seen, 0-2 /HPF 0-2    Hyaline Casts, UA  None Seen /LPF 0-2    Triple Phosphate Crystals, UA  None Seen /HPF Small/1+    Methodology Manual Light Microscopy    Resulting Agency [facility] LAB         Specimen Collected: 08/26/20 08:58   Last Resulted: 08/26/20 19:28   Lab Flowsheet Order Details View Encounter Lab and Collection Details Routing Result History                  CLARK Angulo PA-C, PT  [facility]      Please note that portions of this note may have been completed with a voice recognition program. Efforts were made to edit the dictations, but occasionally words are mistranscribed.

## 2020-08-26 ENCOUNTER — LAB (OUTPATIENT)
Dept: INTERNAL MEDICINE | Facility: CLINIC | Age: 85
End: 2020-08-26

## 2020-08-26 DIAGNOSIS — R31.0 GROSS HEMATURIA: ICD-10-CM

## 2020-08-26 LAB
BACTERIA UR QL AUTO: ABNORMAL /HPF
BILIRUB BLD-MCNC: NEGATIVE MG/DL
CLARITY, POC: ABNORMAL
COLOR UR: YELLOW
EXPIRATION DATE: ABNORMAL
GLUCOSE UR STRIP-MCNC: NEGATIVE MG/DL
HYALINE CASTS UR QL AUTO: ABNORMAL /LPF
KETONES UR QL: NEGATIVE
LEUKOCYTE EST, POC: ABNORMAL
Lab: ABNORMAL
NITRITE UR-MCNC: NEGATIVE MG/ML
PH UR: 9 [PH] (ref 5–8)
PROT UR STRIP-MCNC: NEGATIVE MG/DL
RBC # UR STRIP: ABNORMAL /UL
RBC # UR: ABNORMAL /HPF
REF LAB TEST METHOD: ABNORMAL
SP GR UR: 1.01 (ref 1–1.03)
SQUAMOUS #/AREA URNS HPF: ABNORMAL /HPF
TRI-PHOS CRY URNS QL MICRO: ABNORMAL /HPF
UROBILINOGEN UR QL: NORMAL
WBC UR QL AUTO: ABNORMAL /HPF

## 2020-08-26 PROCEDURE — 87086 URINE CULTURE/COLONY COUNT: CPT | Performed by: PHYSICIAN ASSISTANT

## 2020-08-26 PROCEDURE — 81015 MICROSCOPIC EXAM OF URINE: CPT | Performed by: PHYSICIAN ASSISTANT

## 2020-08-26 PROCEDURE — 81003 URINALYSIS AUTO W/O SCOPE: CPT | Performed by: PHYSICIAN ASSISTANT

## 2020-08-27 LAB — BACTERIA SPEC AEROBE CULT: NO GROWTH

## 2020-08-28 ENCOUNTER — TELEPHONE (OUTPATIENT)
Dept: INTERNAL MEDICINE | Facility: CLINIC | Age: 85
End: 2020-08-28

## 2020-08-28 NOTE — TELEPHONE ENCOUNTER
----- Message from MEI Gunn sent at 8/27/2020  1:19 PM EDT -----  Please let pt and daughter know that although there was some bacteria noted that can be a contaminant from skin and there were no RBC or WBC and culture was negative.   [place]

## 2020-08-28 NOTE — TELEPHONE ENCOUNTER
Chaparrita- daughter [phone]. Advised of clinical message from WILDER Angulo. Good verbal understanding.

## 2020-09-08 ENCOUNTER — OFFICE VISIT (OUTPATIENT)
Dept: INTERNAL MEDICINE | Facility: CLINIC | Age: 85
End: 2020-09-08

## 2020-09-08 VITALS
HEART RATE: 85 BPM | SYSTOLIC BLOOD PRESSURE: 120 MMHG | RESPIRATION RATE: 18 BRPM | DIASTOLIC BLOOD PRESSURE: 70 MMHG | OXYGEN SATURATION: 95 % | TEMPERATURE: 98 F

## 2020-09-08 DIAGNOSIS — R21 RASH: Primary | ICD-10-CM

## 2020-09-08 PROCEDURE — 99213 OFFICE O/P EST LOW 20 MIN: CPT | Performed by: NURSE PRACTITIONER

## 2020-09-08 RX ORDER — HYDROXYZINE HYDROCHLORIDE 10 MG/1
TABLET, FILM COATED ORAL
Qty: 30 TABLET | Refills: 0 | Status: SHIPPED | OUTPATIENT
Start: 2020-09-08 | End: 2020-09-24

## 2020-09-08 NOTE — PROGRESS NOTES
Chief Complaint   Patient presents with   • Rash     both arms patients arm starting itching really bad x1 month patient was giving a bigger dose of keflex from dermatology thinks that could be possibly whats causing it\/ has tried some topical lotions        Subjective     History of Present Illness   Patient is here with her daughter today and reports that she has had forearms rash with itching starting 7/20.  The rash started after she was on Keflex at 1 tablet 4 times daily for UTI.  She normally takes Keflex at bedtime for prophylaxis of UTIs without difficulty.  Since that time she has maintained a rash on her arms that is itchy.  They have used over-the-counter Sarna and a heavy emollient Eucerin without change in her symptoms.  She itches the rash even in her sleep.  The rash is most specifically on her forearms bilaterally.  No one else has rash similar.  She is on low-dose Keflex for prophylaxis and her daughter stopped the medication for a week without change in the patient's symptoms.  She denies any new skin products any new detergents any new clothing items and no new pets.          The following portions of the patient's history were reviewed and updated as appropriate: allergies, current medications, past family history, past medical history, past social history, past surgical history and problem list.      Current Outpatient Medications:   •  acetaminophen (TYLENOL) 500 MG tablet, Take 500 mg by mouth Every 6 (Six) Hours As Needed for Mild Pain ., Disp: , Rfl:   •  amLODIPine (NORVASC) 10 MG tablet, Take 1 tablet by mouth Daily., Disp: 90 tablet, Rfl: 1  •  aspirin 81 MG chewable tablet, Chew 81 mg Daily., Disp: , Rfl:   •  benazepril (LOTENSIN) 40 MG tablet, Take 1 tablet by mouth Daily., Disp: 90 tablet, Rfl: 1  •  cephalexin (KEFLEX) 250 MG capsule, Take 250 mg by mouth Daily., Disp: , Rfl:   •  diclofenac (VOLTAREN) 1 % gel gel, Apply 4 g topically to the appropriate area as directed 4 (Four)  Times a Day., Disp: 100 g, Rfl: 12  •  Dietary Management Product (RHEUMATE) capsule, Take 1 capsule twice daily, Disp: 180 capsule, Rfl: 1  •  famotidine (PEPCID) 40 MG tablet, Take 1 tablet by mouth Every Night., Disp: 90 tablet, Rfl: 0  •  ibuprofen (ADVIL,MOTRIN) 200 MG tablet, Take 200 mg by mouth Every 6 (Six) Hours As Needed for Mild Pain ., Disp: , Rfl:   •  latanoprost (XALATAN) 0.005 % ophthalmic solution, 1 drop Every Night., Disp: , Rfl:   •  metoprolol succinate XL (TOPROL-XL) 50 MG 24 hr tablet, Take 1 tablet by mouth Daily., Disp: 90 tablet, Rfl: 1  •  Multiple Vitamins-Minerals (CENTRUM SILVER PO), Take  by mouth., Disp: , Rfl:   •  Multiple Vitamins-Minerals (PRESERVISION AREDS PO), Take 1 tablet by mouth 2 (Two) Times a Day., Disp: , Rfl:   •  betamethasone valerate (VALISONE) 0.1 % ointment, Apply  topically to the appropriate area as directed 2 (Two) Times a Day., Disp: 45 g, Rfl: 1  •  hydrOXYzine (ATARAX) 10 MG tablet, 1/2 tab bid prn itching, Disp: 30 tablet, Rfl: 0    Vitals:    09/08/20 1753   BP: 120/70   Pulse: 85   Resp: 18   Temp: 98 °F (36.7 °C)   SpO2: 95%       There is no height or weight on file to calculate BMI.        Review of Systems   Constitutional: Negative for activity change, appetite change, chills, fatigue and fever.   HENT: Negative for congestion, ear pain, postnasal drip, sinus pressure and sore throat.    Eyes: Negative for visual disturbance.   Respiratory: Negative for cough and shortness of breath.    Cardiovascular: Negative for chest pain, palpitations and leg swelling.   Gastrointestinal: Negative for abdominal pain, constipation, diarrhea, nausea and GERD.   Genitourinary: Negative for dysuria.   Musculoskeletal: Negative for arthralgias and myalgias.   Skin: Positive for rash.   Allergic/Immunologic: Negative for environmental allergies.   Neurological: Negative for dizziness and headache.   Psychiatric/Behavioral: Negative for sleep disturbance.   All other  systems reviewed and are negative.      Objective   Physical Exam   Constitutional: She appears well-developed and well-nourished.   HENT:   Mouth/Throat: Oropharynx is clear and moist.   Eyes: Conjunctivae are normal.   Cardiovascular: Normal rate and regular rhythm.   Pulmonary/Chest: Effort normal and breath sounds normal.   Skin: Skin is warm and dry. Capillary refill takes 2 to 3 seconds.   Patient has papular erythematous areas noted on her forearms bilaterally.  She also has petechial areas noted on her forearms bilaterally likely related to scratching and use of aspirin.   Psychiatric: She has a normal mood and affect. Her behavior is normal. Judgment and thought content normal.   Nursing note and vitals reviewed.           Assessment/Plan   Anita was seen today for rash.    Diagnoses and all orders for this visit:    Rash  -     betamethasone valerate (VALISONE) 0.1 % ointment; Apply  topically to the appropriate area as directed 2 (Two) Times a Day.  -     hydrOXYzine (ATARAX) 10 MG tablet; 1/2 tab bid prn itching      Likely Keflex as etiology of the rash.  Will start topical steroid cream.  Stop Keflex for 2 weeks to see if symptoms resolve and if they do I would like for her to restart Keflex after that time to see if symptoms recur.  Would consider dermatology/allergy consult.  Patient has a specific dermatologist she would like to see if her symptoms were not improving.  Will use low-dose Atarax for itching at bedtime.  Trim patient's nails.  Have her wear gloves to sleep.    Return if symptoms worsen or fail to improve.  RTC/call  If symptoms worsen  Meds MOA and SE's reviewed and pt v/u

## 2020-09-10 DIAGNOSIS — N81.9 FEMALE GENITAL PROLAPSE, UNSPECIFIED TYPE: Primary | ICD-10-CM

## 2020-09-10 RX ORDER — METOPROLOL SUCCINATE 50 MG/1
TABLET, EXTENDED RELEASE ORAL
Qty: 90 TABLET | Refills: 0 | Status: SHIPPED | OUTPATIENT
Start: 2020-09-10 | End: 2020-11-18 | Stop reason: SDUPTHER

## 2020-09-17 ENCOUNTER — OFFICE VISIT (OUTPATIENT)
Dept: OBSTETRICS AND GYNECOLOGY | Facility: CLINIC | Age: 85
End: 2020-09-17

## 2020-09-17 VITALS — DIASTOLIC BLOOD PRESSURE: 80 MMHG | RESPIRATION RATE: 16 BRPM | SYSTOLIC BLOOD PRESSURE: 130 MMHG

## 2020-09-17 DIAGNOSIS — N81.9 FEMALE GENITAL PROLAPSE, UNSPECIFIED TYPE: ICD-10-CM

## 2020-09-17 PROCEDURE — A4562 PESSARY, NON RUBBER,ANY TYPE: HCPCS | Performed by: OBSTETRICS & GYNECOLOGY

## 2020-09-17 PROCEDURE — 57160 INSERT PESSARY/OTHER DEVICE: CPT | Performed by: OBSTETRICS & GYNECOLOGY

## 2020-09-17 PROCEDURE — 99203 OFFICE O/P NEW LOW 30 MIN: CPT | Performed by: OBSTETRICS & GYNECOLOGY

## 2020-09-17 NOTE — PROGRESS NOTES
"Subjective   Chief Complaint   Patient presents with   • Vaginal Prolapse     Anita Cook is a 89 y.o. year old .  No LMP recorded. Patient is postmenopausal.  She presents to be seen with her daughter Chaparrita because of prolapse.  Chaparrita I think is been taking care of her little bit more often lately and has noticed something prolapsing down.  She says it is about egg size.  When I question Anita she says it is been there a long time.  She apparently had breast cancer about 12 years ago she does not think it is really changed and it was present then.  Anita does not think that it bothers her too much I think she is gotten used to it.  Her daughter notices that there is some stool also on her daily pad.  Anita is not sure why she wears a pad maybe just has been doing it for a long time and she says \"just in case \"sounds like there is more stool there then urine and maybe the stool is where she does not completely wipe after bowel movements.  She does try to take care of herself for the most part.  I mentioned a possible pessary.  She says she had 1 of those years ago and it did not stay and so she forgot all about that.                      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  She does not have any pertinent problems on file.  She  has a past surgical history that includes Tonsillectomy; Thyroidectomy, partial; Multiple tooth extractions; Breast surgery (); Hernia repair (); and Mastectomy (Right, 2008).  Her family history includes Arthritis in her father; Hypertension in her mother; Ovarian cancer in her daughter; Stroke in her maternal aunt and mother.  She  " reports that she has never smoked. She has never used smokeless tobacco. She reports that she does not drink alcohol or use drugs.  She is allergic to bactrim [sulfamethoxazole-trimethoprim].      Current Outpatient Medications:   •  acetaminophen (TYLENOL) 500 MG tablet, Take 500 mg by mouth Every 6 (Six) Hours As Needed for Mild Pain ., Disp: , Rfl:   •  amLODIPine (NORVASC) 10 MG tablet, Take 1 tablet by mouth Daily., Disp: 90 tablet, Rfl: 1  •  aspirin 81 MG chewable tablet, Chew 81 mg Daily., Disp: , Rfl:   •  benazepril (LOTENSIN) 40 MG tablet, Take 1 tablet by mouth Daily., Disp: 90 tablet, Rfl: 1  •  betamethasone valerate (VALISONE) 0.1 % ointment, Apply  topically to the appropriate area as directed 2 (Two) Times a Day., Disp: 45 g, Rfl: 1  •  cephalexin (KEFLEX) 250 MG capsule, Take 250 mg by mouth Daily., Disp: , Rfl:   •  diclofenac (VOLTAREN) 1 % gel gel, Apply 4 g topically to the appropriate area as directed 4 (Four) Times a Day., Disp: 100 g, Rfl: 12  •  Dietary Management Product (RHEUMATE) capsule, Take 1 capsule twice daily, Disp: 180 capsule, Rfl: 1  •  famotidine (PEPCID) 40 MG tablet, Take 1 tablet by mouth Every Night., Disp: 90 tablet, Rfl: 0  •  hydrOXYzine (ATARAX) 10 MG tablet, 1/2 tab bid prn itching, Disp: 30 tablet, Rfl: 0  •  ibuprofen (ADVIL,MOTRIN) 200 MG tablet, Take 200 mg by mouth Every 6 (Six) Hours As Needed for Mild Pain ., Disp: , Rfl:   •  latanoprost (XALATAN) 0.005 % ophthalmic solution, 1 drop Every Night., Disp: , Rfl:   •  metoprolol succinate XL (TOPROL-XL) 50 MG 24 hr tablet, Take 1 tablet by mouth once daily, Disp: 90 tablet, Rfl: 0  •  Multiple Vitamins-Minerals (CENTRUM SILVER PO), Take  by mouth., Disp: , Rfl:   •  Multiple Vitamins-Minerals (PRESERVISION AREDS PO), Take 1 tablet by mouth 2 (Two) Times a Day., Disp: , Rfl:     no or minimal alcohol, nonsmoker, no or mild caffeine use    Review of Systems denies any fatigue cough constipation chest pain sadness  headaches urinary leakage or double vision  Medical history: Positive hypertension arthritis no cholesterol done thyroid depression diabetes seizures or asthma  GYN questionnaire left blank for STD endometriosis cyst fibroids abuse or abnormal Pap smear.  Social history she is  does not smoke drink or use illegal drugs.  OB section updated : 4 vaginal deliveries 1 miscarriage           Objective   /80   Resp 16   Breastfeeding No     General:  well developed; well nourished  no acute distress  appears stated age   Skin:  No suspicious lesions seen   Thyroid: not examined   Lungs:  breathing is unlabored   Heart:  Not performed.   Abdomen: soft, non-tender; no masses  no umbilical or inguinal hernias are present  no hepato-splenomegaly   Pelvis: Clinical staff was present for exam  External genitalia:  normal appearance of the external genitalia including Bartholin's and [place]'s glands.  :  urethral meatus normal; Slight hypertrophy at meatus  Vaginal:  atrophic mucosal changes are present;  Cervix:  normal appearance.  Uterus:  normal size, shape and consistency.  Adnexa:  non palpable bilaterally.  Rectal:  digital rectal exam not performed; anus visually normal appearing. external hemorrhoids present;  Cystocele GRADE 2  Rectocele GRADE 4  Uterine GRADE 4    cervix visible through the introitus without straining          (rectocele is actually only grade 1)     Lab Review   CBC, CMP, TSH, UA and Urine culture    Imaging   CT of abdomen/pelvis report  DEXA  Mammogram report       Assessment     1. Complete uterine prolapse cystocele greater than rectocele; successfully fitted with #4 donut pessary  2.  external hemorrhoids  3. Upon later review of DEXA scan 2018 osteopenia with increased risk for fracture probably related to advanced age.         Plan     1. I have given her and her daughter Chaparrita information regarding pelvic organ prolapse, given information from donut pessary.  They are to call or  return if she has any trouble voiding etc.  2. I have given her a sample of Trimo-[place] gel to use once a week to prevent discharge  3. We will see her back in about 1 month to be sure she is doing okay and then can space out visits        No orders of the defined types were placed in this encounter.    No orders of the defined types were placed in this encounter.             This note was electronically signed.    Abhijeet Kuhn MD  September 17, 2020

## 2020-09-18 ENCOUNTER — OFFICE VISIT (OUTPATIENT)
Dept: OBSTETRICS AND GYNECOLOGY | Facility: CLINIC | Age: 85
End: 2020-09-18

## 2020-09-18 VITALS — RESPIRATION RATE: 16 BRPM | SYSTOLIC BLOOD PRESSURE: 122 MMHG | DIASTOLIC BLOOD PRESSURE: 80 MMHG

## 2020-09-18 DIAGNOSIS — N81.3 COMPLETE UTEROVAGINAL PROLAPSE: Primary | ICD-10-CM

## 2020-09-18 PROCEDURE — 57160 INSERT PESSARY/OTHER DEVICE: CPT | Performed by: OBSTETRICS & GYNECOLOGY

## 2020-09-18 PROCEDURE — A4562 PESSARY, NON RUBBER,ANY TYPE: HCPCS | Performed by: OBSTETRICS & GYNECOLOGY

## 2020-09-18 NOTE — PROGRESS NOTES
Subjective   Chief Complaint   Patient presents with   • Pessary Check     pessary fell out     Anita Meme Cook is a 89 y.o. year old .  No LMP recorded. Patient is postmenopausal.  She presents to be seen because the #4 donut pessary she was fitted for yesterday came out or fell out yesterday when she returned home and was straining to to void.  Unfortunately cannot take that back disc  Discussed that we can try to fit her for a new pessary.  I am afraid the #5 done it would be too large so may try a ring with support to help control her uterine prolapse and secondary cystocele                                  The following portions of the patient's history were reviewed and updated as appropriate:problem list, current medications and allergies      Current Outpatient Medications:   •  acetaminophen (TYLENOL) 500 MG tablet, Take 500 mg by mouth Every 6 (Six) Hours As Needed for Mild Pain ., Disp: , Rfl:   •  amLODIPine (NORVASC) 10 MG tablet, Take 1 tablet by mouth Daily., Disp: 90 tablet, Rfl: 1  •  aspirin 81 MG chewable tablet, Chew 81 mg Daily., Disp: , Rfl:   •  benazepril (LOTENSIN) 40 MG tablet, Take 1 tablet by mouth Daily., Disp: 90 tablet, Rfl: 1  •  betamethasone valerate (VALISONE) 0.1 % ointment, Apply  topically to the appropriate area as directed 2 (Two) Times a Day., Disp: 45 g, Rfl: 1  •  cephalexin (KEFLEX) 250 MG capsule, Take 250 mg by mouth Daily., Disp: , Rfl:   •  diclofenac (VOLTAREN) 1 % gel gel, Apply 4 g topically to the appropriate area as directed 4 (Four) Times a Day., Disp: 100 g, Rfl: 12  •  Dietary Management Product (RHEUMATE) capsule, Take 1 capsule twice daily, Disp: 180 capsule, Rfl: 1  •  famotidine (PEPCID) 40 MG tablet, Take 1 tablet by mouth Every Night., Disp: 90 tablet, Rfl: 0  •  hydrOXYzine (ATARAX) 10 MG tablet, 1/2 tab bid prn itching, Disp: 30 tablet, Rfl: 0  •  ibuprofen (ADVIL,MOTRIN) 200 MG tablet, Take 200 mg by mouth Every 6 (Six) Hours As Needed for  Mild Pain ., Disp: , Rfl:   •  latanoprost (XALATAN) 0.005 % ophthalmic solution, 1 drop Every Night., Disp: , Rfl:   •  metoprolol succinate XL (TOPROL-XL) 50 MG 24 hr tablet, Take 1 tablet by mouth once daily, Disp: 90 tablet, Rfl: 0  •  Multiple Vitamins-Minerals (CENTRUM SILVER PO), Take  by mouth., Disp: , Rfl:   •  Multiple Vitamins-Minerals (PRESERVISION AREDS PO), Take 1 tablet by mouth 2 (Two) Times a Day., Disp: , Rfl:     no or minimal alcohol, nonsmoker, no or mild caffeine use    Review of Systems           Objective   /80   Resp 16   Breastfeeding No     General:  well developed; well nourished  no acute distress  appears stated age   Skin:  No suspicious lesions seen   Thyroid: not examined   Lungs:  breathing is unlabored   Heart:  Not performed.   Abdomen: soft, non-tender; no masses  no umbilical or inguinal hernias are present  no hepato-splenomegaly   Pelvis: Clinical staff was present for exam  External genitalia:  normal appearance of the external genitalia including Bartholin's and [place]'s glands.  :  urethral meatus normal;  Vaginal:  atrophic mucosal changes are present;  Cervix:  normal appearance.  Uterus:  normal size, shape and consistency.  Adnexa:  non palpable bilaterally.  Rectal:  digital rectal exam not performed; anus visually normal appearing.     #6 ring with support is fitted.  The patient went to the bathroom and strained over the commode her daughter was present with her.  The pessary did not come out and after examining her when she laid back down it is in proper position.  The fit her pessary was removed and a #6 ring with support was replaced.     Lab Review   No data reviewed    Imaging   No data reviewed       Assessment     1. Total uterine prolapse with associated cystocele.  Unfortunately #4 donut pessary came out therefore refit her with a #6 ring with support this seems to remain in place despite straining.  Hopefully this will help with hygiene.          Plan     1. Return as previously scheduled; call as needed if inability to void etc.  2. Her daughter Chaparrita ask about the use of the Trimo-cline it said 3 times a week which I think would be excessive I think once or twice a week would be sufficient and she probably will not be able to put the whole applicator in the vagina as the pessary is so low and will not allow as much room for the ointment.        No orders of the defined types were placed in this encounter.    No orders of the defined types were placed in this encounter.             This note was electronically signed.    Abhijeet Kuhn MD  September 18, 2020

## 2020-09-21 ENCOUNTER — OFFICE VISIT (OUTPATIENT)
Dept: OBSTETRICS AND GYNECOLOGY | Facility: CLINIC | Age: 85
End: 2020-09-21

## 2020-09-21 VITALS — DIASTOLIC BLOOD PRESSURE: 80 MMHG | RESPIRATION RATE: 16 BRPM | SYSTOLIC BLOOD PRESSURE: 130 MMHG

## 2020-09-21 DIAGNOSIS — N81.3 COMPLETE UTEROVAGINAL PROLAPSE: ICD-10-CM

## 2020-09-21 DIAGNOSIS — R32 URINARY INCONTINENCE, UNSPECIFIED TYPE: Primary | ICD-10-CM

## 2020-09-21 PROCEDURE — 99213 OFFICE O/P EST LOW 20 MIN: CPT | Performed by: OBSTETRICS & GYNECOLOGY

## 2020-09-21 NOTE — PROGRESS NOTES
Subjective   Chief Complaint   Patient presents with   • Pessary Check     wants pessary out     Anita Cook is a 89 y.o. year old  presenting to be seen because the ring with support pessary that was placed at the end of last week made her urinary incontinence worse.  Her daughter called last evening saying she was having incontinence issues.  Previously she did not know what I meant by that but now she did because of so much urine leaking.  She is also in more pain with pressure and never something bulging down that she took a picture of.  The picture that she shows could be does have some hemorrhoids and some vaginal mucosa.                The following portions of the patient's history were reviewed and updated as appropriate:problem list, current medications and allergies      Current Outpatient Medications:   •  acetaminophen (TYLENOL) 500 MG tablet, Take 500 mg by mouth Every 6 (Six) Hours As Needed for Mild Pain ., Disp: , Rfl:   •  amLODIPine (NORVASC) 10 MG tablet, Take 1 tablet by mouth Daily., Disp: 90 tablet, Rfl: 1  •  aspirin 81 MG chewable tablet, Chew 81 mg Daily., Disp: , Rfl:   •  benazepril (LOTENSIN) 40 MG tablet, Take 1 tablet by mouth Daily., Disp: 90 tablet, Rfl: 1  •  betamethasone valerate (VALISONE) 0.1 % ointment, Apply  topically to the appropriate area as directed 2 (Two) Times a Day., Disp: 45 g, Rfl: 1  •  cephalexin (KEFLEX) 250 MG capsule, Take 250 mg by mouth Daily., Disp: , Rfl:   •  diclofenac (VOLTAREN) 1 % gel gel, Apply 4 g topically to the appropriate area as directed 4 (Four) Times a Day., Disp: 100 g, Rfl: 12  •  Dietary Management Product (RHEUMATE) capsule, Take 1 capsule twice daily, Disp: 180 capsule, Rfl: 1  •  famotidine (PEPCID) 40 MG tablet, Take 1 tablet by mouth Every Night., Disp: 90 tablet, Rfl: 0  •  hydrOXYzine (ATARAX) 10 MG tablet, 1/2 tab bid prn itching, Disp: 30 tablet, Rfl: 0  •  ibuprofen (ADVIL,MOTRIN) 200 MG tablet, Take 200 mg by mouth  Every 6 (Six) Hours As Needed for Mild Pain ., Disp: , Rfl:   •  latanoprost (XALATAN) 0.005 % ophthalmic solution, 1 drop Every Night., Disp: , Rfl:   •  metoprolol succinate XL (TOPROL-XL) 50 MG 24 hr tablet, Take 1 tablet by mouth once daily, Disp: 90 tablet, Rfl: 0  •  Multiple Vitamins-Minerals (CENTRUM SILVER PO), Take  by mouth., Disp: , Rfl:   •  Multiple Vitamins-Minerals (PRESERVISION AREDS PO), Take 1 tablet by mouth 2 (Two) Times a Day., Disp: , Rfl:     Review of Systems          Objective   /80   Resp 16   Breastfeeding No     General:  well developed; well nourished  no acute distress  appears stated age   Skin:  No suspicious lesions seen   Thyroid: not examined   Lungs:  breathing is unlabored   Heart:  Not performed.       Abdomen: soft, non-tender; no masses  no umbilical or inguinal hernias are present  no hepato-splenomegaly   Pelvis: Clinical staff was present for exam  External genitalia:  normal appearance of the external genitalia including Bartholin's and [place]'s glands.  :  There is a urethral carbuncle  Vaginal:  atrophic mucosal changes are present; Slight discharge and relaxation in general on speculum exam  Uterus:  normal size, shape and consistency.  Adnexa:  non palpable bilaterally.  Rectal:  digital rectal exam not performed; anus visually normal appearing. external hemorrhoids present;   The ring with support pessary actually was in will be considered normal position it is removed cleaned we will get back to the patient for possible but unlikely future use     Lab Review   No data reviewed    Imaging   No data reviewed       Assessment     1. Total uterine procidentia with cystocele.  Unfortunately done a pessary did not stay and and ring with support pessary caused her urinary incontinence.  There did appear to be some stool on the pessary today therefore I did a speculum examination but did not see any evidence of any further stool inside.  2. Originally we had tried  the pessary for hygiene issues since she had uterine prolapse that her daughter noticed now that she is living with Chaparrita her daughter and she and Chaparrita is helping take care of her and clean up occasionally.     Plan     1. At this time option would be potential inflatable pessary to use as needed if she is out and about but the stem would hang down and hygiene would be an issue of left and continuously.  [place] pessary may be too uncomfortable to place and remove and may worsen incontinence although potentially could try a smaller size.  Other option would be [place] type colpocleisis  2. Follow-up as needed    No orders of the defined types were placed in this encounter.    No orders of the defined types were placed in this encounter.            This note was electronically signed.    Abhijeet Kuhn MD  September 21, 2020

## 2020-09-24 ENCOUNTER — OFFICE VISIT (OUTPATIENT)
Dept: INTERNAL MEDICINE | Facility: CLINIC | Age: 85
End: 2020-09-24

## 2020-09-24 VITALS
WEIGHT: 113.2 LBS | RESPIRATION RATE: 18 BRPM | TEMPERATURE: 97.3 F | OXYGEN SATURATION: 95 % | DIASTOLIC BLOOD PRESSURE: 76 MMHG | SYSTOLIC BLOOD PRESSURE: 118 MMHG | BODY MASS INDEX: 22.11 KG/M2 | HEART RATE: 91 BPM

## 2020-09-24 DIAGNOSIS — H40.9 GLAUCOMA OF RIGHT EYE, UNSPECIFIED GLAUCOMA TYPE: ICD-10-CM

## 2020-09-24 DIAGNOSIS — Z74.09 IMPAIRED FUNCTIONAL MOBILITY, BALANCE, AND ENDURANCE: Primary | ICD-10-CM

## 2020-09-24 DIAGNOSIS — H35.30 MACULAR DEGENERATION OF BOTH EYES, UNSPECIFIED TYPE: ICD-10-CM

## 2020-09-24 DIAGNOSIS — Z91.81 AT HIGH RISK FOR FALLS: ICD-10-CM

## 2020-09-24 DIAGNOSIS — R26.9 GAIT DISTURBANCE: ICD-10-CM

## 2020-09-24 PROCEDURE — 99213 OFFICE O/P EST LOW 20 MIN: CPT | Performed by: NURSE PRACTITIONER

## 2020-10-12 ENCOUNTER — TELEPHONE (OUTPATIENT)
Dept: INTERNAL MEDICINE | Facility: CLINIC | Age: 85
End: 2020-10-12

## 2020-10-12 NOTE — TELEPHONE ENCOUNTER
[facility] called and states they just recently dc'd patient due to her potential being met. She states they can't use Mental Health as a Dx, but they will go out and evaluate the patient to see if they can readmit her.

## 2020-11-05 NOTE — TELEPHONE ENCOUNTER
Last Office Visit: 09/24/2020  Next Office Visit: 11/13/2020    Labs completed in past 6 months? yes  Labs completed in past year? Yes     Last Refill Date: 02/04/2020  Quantity: 90   Refills: 1    Pharmacy: [address] [phone] [facility] [phone] FX

## 2020-11-07 RX ORDER — AMLODIPINE BESYLATE 10 MG/1
TABLET ORAL
Qty: 30 TABLET | Refills: 0 | Status: SHIPPED | OUTPATIENT
Start: 2020-11-07 | End: 2020-11-18 | Stop reason: SDUPTHER

## 2020-11-08 RX ORDER — AMLODIPINE BESYLATE 10 MG/1
10 TABLET ORAL DAILY
Qty: 90 TABLET | Refills: 1 | OUTPATIENT
Start: 2020-11-08

## 2020-11-13 ENCOUNTER — TELEPHONE (OUTPATIENT)
Dept: INTERNAL MEDICINE | Facility: CLINIC | Age: 85
End: 2020-11-13

## 2020-11-13 ENCOUNTER — LAB (OUTPATIENT)
Dept: INTERNAL MEDICINE | Facility: CLINIC | Age: 85
End: 2020-11-13

## 2020-11-13 DIAGNOSIS — Z87.440 HISTORY OF UTI: ICD-10-CM

## 2020-11-13 DIAGNOSIS — Z87.440 HISTORY OF UTI: Primary | ICD-10-CM

## 2020-11-13 DIAGNOSIS — R31.9 URINARY TRACT INFECTION WITH HEMATURIA, SITE UNSPECIFIED: Primary | ICD-10-CM

## 2020-11-13 DIAGNOSIS — N39.0 URINARY TRACT INFECTION WITH HEMATURIA, SITE UNSPECIFIED: Primary | ICD-10-CM

## 2020-11-13 LAB
BILIRUB BLD-MCNC: NEGATIVE MG/DL
CLARITY, POC: ABNORMAL
COLOR UR: YELLOW
EXPIRATION DATE: ABNORMAL
GLUCOSE UR STRIP-MCNC: NEGATIVE MG/DL
KETONES UR QL: NEGATIVE
LEUKOCYTE EST, POC: ABNORMAL
Lab: ABNORMAL
NITRITE UR-MCNC: POSITIVE MG/ML
PH UR: 7 [PH] (ref 5–8)
PROT UR STRIP-MCNC: NEGATIVE MG/DL
RBC # UR STRIP: ABNORMAL /UL
SP GR UR: 1.01 (ref 1–1.03)
UROBILINOGEN UR QL: NORMAL

## 2020-11-13 PROCEDURE — 87077 CULTURE AEROBIC IDENTIFY: CPT | Performed by: NURSE PRACTITIONER

## 2020-11-13 PROCEDURE — 87186 SC STD MICRODIL/AGAR DIL: CPT | Performed by: NURSE PRACTITIONER

## 2020-11-13 PROCEDURE — 81003 URINALYSIS AUTO W/O SCOPE: CPT | Performed by: NURSE PRACTITIONER

## 2020-11-13 PROCEDURE — 87086 URINE CULTURE/COLONY COUNT: CPT | Performed by: NURSE PRACTITIONER

## 2020-11-13 NOTE — TELEPHONE ENCOUNTER
PATIENTS DAUGHTER STATES THAT MOTHER CANNOT DO A URINE TEST IN THE OFFICE. PATIENTS DAUGHTER WAS WANTING KNOW IF SHE CAN GET A TEST SENT TO HER TO BRING INTO MOTHERS APPOINTMENT ON 11/16.    PATIENT CALL BACK [phone]

## 2020-11-13 NOTE — TELEPHONE ENCOUNTER
Spoke with the patient's daughter, she has a urine cup and hat at home from the last time that she came into the office. She wanted to know if she could drop off a urine sample today to test to make sure she doesn't have an infection of if she would need to wait for her upcoming appointment on 11/16

## 2020-11-15 LAB — BACTERIA SPEC AEROBE CULT: ABNORMAL

## 2020-11-16 RX ORDER — CEFDINIR 300 MG/1
300 CAPSULE ORAL 2 TIMES DAILY
Qty: 20 CAPSULE | Refills: 0 | Status: SHIPPED | OUTPATIENT
Start: 2020-11-16 | End: 2020-12-01

## 2020-11-18 ENCOUNTER — OFFICE VISIT (OUTPATIENT)
Dept: INTERNAL MEDICINE | Facility: CLINIC | Age: 85
End: 2020-11-18

## 2020-11-18 VITALS
TEMPERATURE: 97.5 F | RESPIRATION RATE: 21 BRPM | DIASTOLIC BLOOD PRESSURE: 82 MMHG | SYSTOLIC BLOOD PRESSURE: 124 MMHG | HEART RATE: 84 BPM

## 2020-11-18 DIAGNOSIS — H35.30 MACULAR DEGENERATION OF BOTH EYES, UNSPECIFIED TYPE: ICD-10-CM

## 2020-11-18 DIAGNOSIS — Z74.09 IMPAIRED FUNCTIONAL MOBILITY, BALANCE, AND ENDURANCE: ICD-10-CM

## 2020-11-18 DIAGNOSIS — M48.062 LUMBAR STENOSIS WITH NEUROGENIC CLAUDICATION: ICD-10-CM

## 2020-11-18 DIAGNOSIS — R26.9 GAIT DISTURBANCE: ICD-10-CM

## 2020-11-18 DIAGNOSIS — Z91.81 AT HIGH RISK FOR FALLS: ICD-10-CM

## 2020-11-18 DIAGNOSIS — R82.998 LEUKOCYTES IN URINE: ICD-10-CM

## 2020-11-18 DIAGNOSIS — M85.80 OSTEOPENIA, UNSPECIFIED LOCATION: ICD-10-CM

## 2020-11-18 DIAGNOSIS — M25.511 CHRONIC PAIN IN RIGHT SHOULDER: ICD-10-CM

## 2020-11-18 DIAGNOSIS — G89.29 CHRONIC PAIN IN RIGHT SHOULDER: ICD-10-CM

## 2020-11-18 DIAGNOSIS — I10 ESSENTIAL HYPERTENSION: Primary | ICD-10-CM

## 2020-11-18 DIAGNOSIS — K21.9 GASTROESOPHAGEAL REFLUX DISEASE, UNSPECIFIED WHETHER ESOPHAGITIS PRESENT: ICD-10-CM

## 2020-11-18 DIAGNOSIS — H40.9 GLAUCOMA OF RIGHT EYE, UNSPECIFIED GLAUCOMA TYPE: ICD-10-CM

## 2020-11-18 LAB
ALBUMIN SERPL-MCNC: 4.3 G/DL (ref 3.5–5.2)
ALBUMIN/GLOB SERPL: 1.2 G/DL
ALP SERPL-CCNC: 73 U/L (ref 39–117)
ALT SERPL W P-5'-P-CCNC: 14 U/L (ref 1–33)
ANION GAP SERPL CALCULATED.3IONS-SCNC: 10.6 MMOL/L (ref 5–15)
AST SERPL-CCNC: 19 U/L (ref 1–32)
BASOPHILS # BLD AUTO: 0.03 10*3/MM3 (ref 0–0.2)
BASOPHILS NFR BLD AUTO: 0.4 % (ref 0–1.5)
BILIRUB BLD-MCNC: NEGATIVE MG/DL
BILIRUB SERPL-MCNC: 0.3 MG/DL (ref 0–1.2)
BUN SERPL-MCNC: 15 MG/DL (ref 8–23)
BUN/CREAT SERPL: 23.8 (ref 7–25)
CALCIUM SPEC-SCNC: 9.3 MG/DL (ref 8.2–9.6)
CHLORIDE SERPL-SCNC: 96 MMOL/L (ref 98–107)
CLARITY, POC: ABNORMAL
CO2 SERPL-SCNC: 27.4 MMOL/L (ref 22–29)
COLOR UR: YELLOW
CREAT SERPL-MCNC: 0.63 MG/DL (ref 0.57–1)
DEPRECATED RDW RBC AUTO: 42.7 FL (ref 37–54)
EOSINOPHIL # BLD AUTO: 0.14 10*3/MM3 (ref 0–0.4)
EOSINOPHIL NFR BLD AUTO: 1.9 % (ref 0.3–6.2)
ERYTHROCYTE [DISTWIDTH] IN BLOOD BY AUTOMATED COUNT: 12.5 % (ref 12.3–15.4)
EXPIRATION DATE: ABNORMAL
GFR SERPL CREATININE-BSD FRML MDRD: 88 ML/MIN/1.73
GLOBULIN UR ELPH-MCNC: 3.6 GM/DL
GLUCOSE SERPL-MCNC: 103 MG/DL (ref 65–99)
GLUCOSE UR STRIP-MCNC: NEGATIVE MG/DL
HCT VFR BLD AUTO: 37 % (ref 34–46.6)
HGB BLD-MCNC: 12.5 G/DL (ref 12–15.9)
IMM GRANULOCYTES # BLD AUTO: 0.02 10*3/MM3 (ref 0–0.05)
IMM GRANULOCYTES NFR BLD AUTO: 0.3 % (ref 0–0.5)
KETONES UR QL: NEGATIVE
LEUKOCYTE EST, POC: ABNORMAL
LYMPHOCYTES # BLD AUTO: 0.93 10*3/MM3 (ref 0.7–3.1)
LYMPHOCYTES NFR BLD AUTO: 12.6 % (ref 19.6–45.3)
Lab: ABNORMAL
MCH RBC QN AUTO: 32.1 PG (ref 26.6–33)
MCHC RBC AUTO-ENTMCNC: 33.8 G/DL (ref 31.5–35.7)
MCV RBC AUTO: 94.9 FL (ref 79–97)
MONOCYTES # BLD AUTO: 0.79 10*3/MM3 (ref 0.1–0.9)
MONOCYTES NFR BLD AUTO: 10.7 % (ref 5–12)
NEUTROPHILS NFR BLD AUTO: 5.47 10*3/MM3 (ref 1.7–7)
NEUTROPHILS NFR BLD AUTO: 74.1 % (ref 42.7–76)
NITRITE UR-MCNC: NEGATIVE MG/ML
NRBC BLD AUTO-RTO: 0 /100 WBC (ref 0–0.2)
PH UR: 8 [PH] (ref 5–8)
PLATELET # BLD AUTO: 244 10*3/MM3 (ref 140–450)
PMV BLD AUTO: 9.9 FL (ref 6–12)
POTASSIUM SERPL-SCNC: 3.9 MMOL/L (ref 3.5–5.2)
PROT SERPL-MCNC: 7.9 G/DL (ref 6–8.5)
PROT UR STRIP-MCNC: NEGATIVE MG/DL
RBC # BLD AUTO: 3.9 10*6/MM3 (ref 3.77–5.28)
RBC # UR STRIP: NEGATIVE /UL
SODIUM SERPL-SCNC: 134 MMOL/L (ref 136–145)
SP GR UR: 1.01 (ref 1–1.03)
UROBILINOGEN UR QL: NORMAL
WBC # BLD AUTO: 7.38 10*3/MM3 (ref 3.4–10.8)

## 2020-11-18 PROCEDURE — 81003 URINALYSIS AUTO W/O SCOPE: CPT | Performed by: NURSE PRACTITIONER

## 2020-11-18 PROCEDURE — 87086 URINE CULTURE/COLONY COUNT: CPT | Performed by: NURSE PRACTITIONER

## 2020-11-18 PROCEDURE — 85025 COMPLETE CBC W/AUTO DIFF WBC: CPT | Performed by: NURSE PRACTITIONER

## 2020-11-18 PROCEDURE — 80053 COMPREHEN METABOLIC PANEL: CPT | Performed by: NURSE PRACTITIONER

## 2020-11-18 PROCEDURE — 36415 COLL VENOUS BLD VENIPUNCTURE: CPT | Performed by: NURSE PRACTITIONER

## 2020-11-18 PROCEDURE — 99214 OFFICE O/P EST MOD 30 MIN: CPT | Performed by: NURSE PRACTITIONER

## 2020-11-18 RX ORDER — BENAZEPRIL HYDROCHLORIDE 40 MG/1
40 TABLET, FILM COATED ORAL DAILY
Qty: 90 TABLET | Refills: 1 | Status: SHIPPED | OUTPATIENT
Start: 2020-11-18 | End: 2021-05-13 | Stop reason: SDUPTHER

## 2020-11-18 RX ORDER — METOPROLOL SUCCINATE 50 MG/1
50 TABLET, EXTENDED RELEASE ORAL DAILY
Qty: 90 TABLET | Refills: 1 | Status: SHIPPED | OUTPATIENT
Start: 2020-11-18 | End: 2021-01-14 | Stop reason: SDUPTHER

## 2020-11-18 RX ORDER — AMLODIPINE BESYLATE 10 MG/1
10 TABLET ORAL DAILY
Qty: 90 TABLET | Refills: 1 | Status: SHIPPED | OUTPATIENT
Start: 2020-11-18 | End: 2021-05-27

## 2020-11-18 RX ORDER — FAMOTIDINE 40 MG/1
40 TABLET, FILM COATED ORAL NIGHTLY
Qty: 90 TABLET | Refills: 1 | Status: SHIPPED | OUTPATIENT
Start: 2020-11-18 | End: 2021-08-05

## 2020-11-18 NOTE — PROGRESS NOTES
Chief Complaint   Patient presents with   • Essential hypertension        Subjective     History of Present Illness   Patient is here today for follow-up of chronic problems.    She has hypertension and her blood pressure is normal today.  Taking medications as prescribed-metoprolol benazepril amlodipine.  Aspirin with abdominal dyschezia prior stools.  Aspirin is low-dose and coated with meals.      Patient has GERD patient taking Pepcid without exacerbation.    Patient has lumbar stenosis with neurogenic claudication    Patient has chronic right shoulder pain-using low dose infrequent Diclofenac gel 1%    Patient has osteopenia DEXA 1/19     Patient has frequent UTIs patient on prophylaxis antibiotic in the past per urology.  Patient does not want urology consult at this time.  She was on maintenance keflex thought to be causing rash.  Patient has also had hematuria but declines work-up she has had difficulty with pessary as well for pelvic prolapse she does have calcium oxalate likely stone.  She has not symptoms at this time. Family called on 11/3/20 due to concern over UTI.  Pt grew e coli and is on antibiotic therapy at this time.  No c/o urinary symptoms today.    Patient has macular degeneration followed with ophthalmology Dr. Bell patient also has glaucoma.    Patient has a gait disturbance with high risk for falls and functional immobility    HH is completeing HH this week and pt is stronger post therapy.      The following portions of the patient's history were reviewed and updated as appropriate: allergies, current medications, past family history, past medical history, past social history, past surgical history and problem list.      Current Outpatient Medications:   •  acetaminophen (TYLENOL) 500 MG tablet, Take 500 mg by mouth Every 6 (Six) Hours As Needed for Mild Pain ., Disp: , Rfl:   •  amLODIPine (NORVASC) 10 MG tablet, Take 1 tablet by mouth Daily., Disp: 90 tablet, Rfl: 1  •  aspirin 81 MG  chewable tablet, Chew 81 mg Daily., Disp: , Rfl:   •  benazepril (LOTENSIN) 40 MG tablet, Take 1 tablet by mouth Daily., Disp: 90 tablet, Rfl: 1  •  cefdinir (OMNICEF) 300 MG capsule, Take 1 capsule by mouth 2 (Two) Times a Day., Disp: 20 capsule, Rfl: 0  •  famotidine (PEPCID) 40 MG tablet, Take 1 tablet by mouth Every Night., Disp: 90 tablet, Rfl: 1  •  ibuprofen (ADVIL,MOTRIN) 200 MG tablet, Take 200 mg by mouth Every 6 (Six) Hours As Needed for Mild Pain ., Disp: , Rfl:   •  latanoprost (XALATAN) 0.005 % ophthalmic solution, 1 drop Every Night., Disp: , Rfl:   •  metoprolol succinate XL (TOPROL-XL) 50 MG 24 hr tablet, Take 1 tablet by mouth Daily., Disp: 90 tablet, Rfl: 1  •  Multiple Vitamins-Minerals (CENTRUM SILVER PO), Take  by mouth., Disp: , Rfl:   •  Multiple Vitamins-Minerals (PRESERVISION AREDS PO), Take 1 tablet by mouth 2 (Two) Times a Day., Disp: , Rfl:   •  Diclofenac Sodium (VOLTAREN) 1 % gel gel, APPLY 4 GRAMS TOPICALLY 4 TIMES DAILY TO THE APPROPRIATE AREA AS DIRECTED, Disp: 100 g, Rfl: 0    Vitals:    11/18/20 0927   BP: 124/82   Pulse: 84   Resp: 21   Temp: 97.5 °F (36.4 °C)       There is no height or weight on file to calculate BMI.        Review of Systems   Constitutional: Negative for activity change, appetite change, chills, fatigue and fever.   HENT: Negative for congestion, postnasal drip and sore throat.    Eyes: Negative for blurred vision and visual disturbance.   Respiratory: Negative for cough and shortness of breath.    Cardiovascular: Negative for chest pain, palpitations and leg swelling.   Gastrointestinal: Negative for abdominal pain, constipation, diarrhea, nausea and GERD.   Musculoskeletal: Negative for arthralgias, myalgias and neck pain.   Skin: Negative for rash.   Allergic/Immunologic: Negative for environmental allergies.   Neurological: Negative for dizziness.   Psychiatric/Behavioral: Negative for sleep disturbance.   All other systems reviewed and are  negative.      Objective   Physical Exam  Vitals signs and nursing note reviewed.   Constitutional:       Appearance: She is well-developed.   HENT:      Head: Normocephalic and atraumatic.      Comments: [place]     Right Ear: External ear normal.      Left Ear: External ear normal.      Nose: Nose normal.   Neck:      Thyroid: No thyromegaly.   Cardiovascular:      Rate and Rhythm: Normal rate and regular rhythm.   Pulmonary:      Effort: Pulmonary effort is normal.      Breath sounds: Normal breath sounds.   Abdominal:      General: Bowel sounds are normal. There is no distension.      Palpations: Abdomen is soft.      Tenderness: There is no abdominal tenderness.   Lymphadenopathy:      Cervical: No cervical adenopathy.   Skin:     Capillary Refill: Capillary refill takes 2 to 3 seconds.   Neurological:      Mental Status: She is alert and oriented to person, place, and time.   Psychiatric:         Behavior: Behavior normal.              Assessment/Plan   Diagnoses and all orders for this visit:    1. Essential hypertension (Primary)  -     Comprehensive Metabolic Panel  -     Cancel: TSH  -     POC Urinalysis Dipstick, Automated  -     Cancel: Lipid Panel  -     amLODIPine (NORVASC) 10 MG tablet; Take 1 tablet by mouth Daily.  Dispense: 90 tablet; Refill: 1    2. Gastroesophageal reflux disease, unspecified whether esophagitis present  -     CBC & Differential  -     CBC Auto Differential    3. Lumbar stenosis with neurogenic claudication    4. Chronic pain in right shoulder    5. Osteopenia, unspecified location    6. Macular degeneration of both eyes, unspecified type    7. Glaucoma of right eye, unspecified glaucoma type    8. Gait disturbance    9. At high risk for falls    10. Impaired functional mobility, balance, and endurance    11. Leukocytes in urine  -     Urine Culture - Urine, Urine, Clean Catch    Other orders  -     metoprolol succinate XL (TOPROL-XL) 50 MG 24 hr tablet; Take 1 tablet by mouth Daily.   Dispense: 90 tablet; Refill: 1  -     famotidine (PEPCID) 40 MG tablet; Take 1 tablet by mouth Every Night.  Dispense: 90 tablet; Refill: 1  -     benazepril (LOTENSIN) 40 MG tablet; Take 1 tablet by mouth Daily.  Dispense: 90 tablet; Refill: 1          Return in about 6 months (around 5/18/2021) for fasting, Next scheduled follow up, [employer] [facility].  RTC/call  If symptoms worsen  Meds MOA and SE's reviewed and pt v/u

## 2020-11-18 NOTE — PATIENT INSTRUCTIONS
Advance Care Planning and Advance Directives     You make decisions on a daily basis - decisions about where you want to live, your career, your home, your life. Perhaps one of the most important decisions you face is your choice for future medical care. Take time to talk with your family and your healthcare team and start planning today.  Advance Care Planning is a process that can help you:  · Understand possible future healthcare decisions in light of your own experiences  · Reflect on those decision in light of your goals and values  · Discuss your decisions with those closest to you and the healthcare professionals that care for you  · Make a plan by creating a document that reflects your wishes    Surrogate Decision Maker  In the event of a medical emergency, which has left you unable to communicate or to make your own decisions, you would need someone to make decisions for you.  It is important to discuss your preferences for medical treatment with this person while you are in good health.     Qualities of a surrogate decision maker:  • Willing to take on this role and responsibility  • Knows what you want for future medical care  • Willing to follow your wishes even if they don't agree with them  • Able to make difficult medical decisions under stressful circumstances    Advance Directives  These are legal documents you can create that will guide your healthcare team and decision maker(s) when needed. These documents can be stored in the electronic medical record.    · Living Will - a legal document to guide your care if you have a terminal condition or a serious illness and are unable to communicate. States vary by statute in document names/types, but most forms may include one or more of the following:        -  Directions regarding life-prolonging treatments        -  Directions regarding artificially provided nutrition/hydration        -  Choosing a healthcare decision maker        -  Direction  regarding organ/tissue donation    · Durable Power of  for Healthcare - this document names an -in-fact to make medical decisions for you, but it may also allow this person to make personal and financial decisions for you. Please seek the advice of an  if you need this type of document.    **Advance Directives are not required and no one may discriminate against you if you do not sign one.    Medical Orders  Many states allow specific forms/orders signed by your physician to record your wishes for medical treatment in your current state of health. This form, signed in personal communication with your physician, addresses resuscitation and other medical interventions that you may or may not want.      For more information or to schedule a time with a [facility] Advance Care Planning Facilitator contact: [email or web address] or call [phone] and someone will contact you directly.

## 2020-11-19 DIAGNOSIS — G89.29 CHRONIC PAIN IN RIGHT SHOULDER: ICD-10-CM

## 2020-11-19 DIAGNOSIS — M25.511 CHRONIC PAIN IN RIGHT SHOULDER: ICD-10-CM

## 2020-11-19 LAB — BACTERIA SPEC AEROBE CULT: ABNORMAL

## 2020-11-24 ENCOUNTER — LAB (OUTPATIENT)
Dept: INTERNAL MEDICINE | Facility: CLINIC | Age: 85
End: 2020-11-24

## 2020-11-24 DIAGNOSIS — R30.0 DYSURIA: Primary | ICD-10-CM

## 2020-11-24 LAB
BILIRUB BLD-MCNC: NEGATIVE MG/DL
CLARITY, POC: CLEAR
COLOR UR: YELLOW
EXPIRATION DATE: NORMAL
GLUCOSE UR STRIP-MCNC: NEGATIVE MG/DL
KETONES UR QL: NEGATIVE
LEUKOCYTE EST, POC: NEGATIVE
Lab: NORMAL
NITRITE UR-MCNC: NEGATIVE MG/ML
PH UR: 8 [PH] (ref 5–8)
PROT UR STRIP-MCNC: NEGATIVE MG/DL
RBC # UR STRIP: NEGATIVE /UL
SP GR UR: 1 (ref 1–1.03)
UROBILINOGEN UR QL: NORMAL

## 2020-11-24 PROCEDURE — 81003 URINALYSIS AUTO W/O SCOPE: CPT | Performed by: NURSE PRACTITIONER

## 2020-12-01 ENCOUNTER — OFFICE VISIT (OUTPATIENT)
Dept: INTERNAL MEDICINE | Facility: CLINIC | Age: 85
End: 2020-12-01

## 2020-12-01 VITALS
TEMPERATURE: 97.3 F | OXYGEN SATURATION: 95 % | DIASTOLIC BLOOD PRESSURE: 82 MMHG | SYSTOLIC BLOOD PRESSURE: 128 MMHG | HEART RATE: 83 BPM

## 2020-12-01 DIAGNOSIS — G89.29 CHRONIC PAIN IN RIGHT SHOULDER: ICD-10-CM

## 2020-12-01 DIAGNOSIS — M25.511 CHRONIC PAIN IN RIGHT SHOULDER: ICD-10-CM

## 2020-12-01 PROCEDURE — 99213 OFFICE O/P EST LOW 20 MIN: CPT | Performed by: NURSE PRACTITIONER

## 2020-12-01 RX ORDER — NAPROXEN 250 MG/1
250 TABLET ORAL 2 TIMES DAILY PRN
COMMUNITY

## 2020-12-09 NOTE — PROGRESS NOTES
"Chief Complaint: \"I am doing rather well today.\"         History of Present Illness:   Patient: Ms. Anita Cook, 89 y.o. female originally referred by Cecy Camacho PA-C in consultation for chronic intractable bilateral knee pain. Patient reports a several year history of pain, which began without incident.  At the time of her last office visit with me in February she wished to be evaluated for her right shoulder pain.  Subsequently, on Meme 15, 2020, she underwent bipolar radiofrequency ablation of the right suprascapular nerve and right axillary nerve, from which she reports experiencing significant functional improvement in her activity and pain levels.  She did participate in any physical therapy for her shoulder.  Additionally on February 12, 2020, she underwent bipolar radiofrequency ablation of the right superomedial, right superolateral and and right inferomedial genicular nerves, from which at the time of her follow-up visit she was unable to specifically quantify her amount of relief, but continues to feel she is able to ambulate in a better form with her walker than before.  At that time she felt as though with her right knee in a better condition she no longer feels intensive pain in her left knee and no longer wished to move forward with this procedure for her left knee.  She returns today for postprocedure follow-up and evaluation.    Problem #1 Right shoulder pain  Pain History: Patient reports a several year history of pain, which began without incident. Pain has progressed in intensity over the past 1 year.   Pain Description: intermittent exacerbation, described as aching and sharp sensation.   Radiation of Pain: The right shoulder pain radiates into the right forearm   Pain intensity today: 0/10  Average pain intensity last week: 1/10  Pain intensity ranges from: 0/10 to 2/10  Aggravating factors: Pain increases with rotation, lifting and certain movements.   Alleviating factors: " Pain decreases with heat, analgesics and Voltaren gel.   Associated Symptoms:   Patient denies numbness or weakness in the upper extremities.     Problem #2 Knee pain  Pain Description: intermittent exacerbation, described as aching, dull, popping, and stiff sensation.   Radiation of Pain: The bilateral knee pain does not radiate. She reports pain in her right shin that only occurs during standing or walking. She uses a walker for ambulation. She reports elements suggesting LSS with claudication  Pain intensity today: 0/10 knee    Average pain intensity last week: 1/10  Pain intensity ranges from: 0/10 to 2/10  Aggravating factors: Pain increases with standing longer than 2 minutes, ambulating more than 50 feet, and climbing steps  Alleviating factors: Pain decreases with sitting, lying down, and analgesics.   Associated Symptoms:   Patient denies numbness or weakness in the lower extremities.   Patient denies any new bladder or bowel problems.   Patient reports difficulties with her balance but denies recent falls      Review of previous therapies and additional medical records:  Anita Cook has already failed the following measures, including:   Conservative Measures: Oral analgesics, topical analgesics, ice and heat, physical therapy  Interventional Measures: 02/12/2020: bipolar radiofrequency ablation of the right superomedial, right superolateral and and right inferomedial genicular nerves   06/15/2020: bipolar radiofrequency ablation of the right suprascapular nerve and right axillary nerve  Patient underwent several bilateral knee injections with Cecy Camacho PA-C, with the last procedure on 12/26/2019. Patient underwent a right shoulder joint injection with Cecy Camacho PA-C,  on 12/12/2019.  Surgical Measures: No previous history of knee surgery or shoulder surgery.   Anita Cook underwent surgical consultation with Cecy Camacho PA-C on 12/12/2019, and was found not  to be a surgical candidate.  Anita Cook presents with significant comorbidities including hypertension, osteoarthritis, GERD, glaucoma, osteopenia, history of stroke in 2018, history of breast cancer 11 years ago; engaged in treatment.  In terms of current analgesics, Anita Cook takes: Voltaren gel, Advil, Tylenol  I have reviewed Yogesh Report #[id number] consistent to medication reconciliation.       Global Pain Scale 01-21  2020  02-19  2020  05-27  2020  12-10  2020           Pain  10  14  12  2           Feelings  1  0  0  1           Clinical outcomes  3  9  8 0           Activities  14  14  15  0           GPS Total:  28  37  35  3              Review of Diagnostic Studies:  Bilateral shoulder x-rays 10/17/2019: Right shoulder 3 views: Radiographs demonstrate diffuse rotator cuff arthropathy with proximal migration of the humeral head relative to the glenoid.  There is medial erosion of the glenohumeral joint.  There appears to be some slight eccentric wear posteriorly at the glenohumeral articulation  Left shoulder 3 views: Radiographs demonstrate severe glenohumeral arthritis with bone-on-bone articulation at the central portion of the glenohumeral articulation.  There does appear to be significant arthritic changes involving the glenohumeral joint with osteophytes at the inferior portion of the humeral head and glenoid.  X-Ray Bilateral Knee on 10/17/2019: I have reviewed the images with the patient and her family. Right Knee: moderate to severe tricompartmental arthritis with genu valgum alignment, periarticular osteophytes visualized in all compartments. Left Knee: mild tricompartmental osteoarthritis.     Review of Systems   Constitutional: Positive for activity change.   Musculoskeletal: Positive for arthralgias, gait problem, myalgias and neck pain.   All other systems reviewed and are negative.        Patient Active Problem List   Diagnosis   • Gastroesophageal reflux disease   •  Cataract cortical, senile, bilateral   • Frequent UTI   • Essential hypertension   • Osteopenia   • Cerebrovascular accident (CVA) (CMS/HCC)   • Macular degeneration of both eyes   • Glaucoma of right eye   • Chronic pain in right shoulder   • Bilateral primary osteoarthritis of knee   • Gait disturbance   • Lumbar stenosis with neurogenic claudication   • At high risk for falls   • Impaired functional mobility, balance, and endurance   • Hematuria   • Hypercalcemia   • Schatzki's ring   • Nonexudative age-related macular degeneration   • Uterine descensus and cystocele       Past Medical History:   Diagnosis Date   • Arthritis    • Breast cancer (CMS/HCC)    • Cataracts, bilateral    • Fall 9/4/2019   • Fractures    • Frequent UTI    • GERD (gastroesophageal reflux disease)    • Glaucoma    • History of breast cancer 9/4/2019   • Hypertension    • Low bone density    • Stroke (cerebrum) (CMS/HCC)          Past Surgical History:   Procedure Laterality Date   • BREAST SURGERY  2008   • HERNIA REPAIR  2000   • KNEE SURGERY Right 2020    Nerve ablation   • MASTECTOMY Right 2008   • MULTIPLE TOOTH EXTRACTIONS  1970   • SHOULDER SURGERY Right 2020    Nerve ablation    • THYROIDECTOMY, PARTIAL  1980   • TONSILLECTOMY           Family History   Problem Relation Age of Onset   • Hypertension Mother    • Stroke Mother    • Arthritis Father    • Stroke Maternal Aunt    • Ovarian cancer Daughter    • Breast cancer Neg Hx          Social History     Socioeconomic History   • Marital status:      Spouse name: Not on file   • Number of children: Not on file   • Years of education: Not on file   • Highest education level: Not on file   Tobacco Use   • Smoking status: Never Smoker   • Smokeless tobacco: Never Used   Substance and Sexual Activity   • Alcohol use: No     Frequency: Never   • Drug use: No   • Sexual activity: Never           Current Outpatient Medications:   •  acetaminophen (TYLENOL) 500 MG tablet, Take 500 mg  "by mouth Every 6 (Six) Hours As Needed for Mild Pain ., Disp: , Rfl:   •  amLODIPine (NORVASC) 10 MG tablet, Take 1 tablet by mouth Daily., Disp: 90 tablet, Rfl: 1  •  aspirin 81 MG chewable tablet, Chew 81 mg Daily., Disp: , Rfl:   •  benazepril (LOTENSIN) 40 MG tablet, Take 1 tablet by mouth Daily., Disp: 90 tablet, Rfl: 1  •  Diclofenac Sodium (VOLTAREN) 1 % gel gel, Apply  topically to the appropriate area as directed 2 (Two) Times a Day., Disp: 100 g, Rfl: 0  •  famotidine (PEPCID) 40 MG tablet, Take 1 tablet by mouth Every Night., Disp: 90 tablet, Rfl: 1  •  ibuprofen (ADVIL,MOTRIN) 200 MG tablet, Take 200 mg by mouth Every 6 (Six) Hours As Needed for Mild Pain ., Disp: , Rfl:   •  latanoprost (XALATAN) 0.005 % ophthalmic solution, 1 drop Every Night., Disp: , Rfl:   •  metoprolol succinate XL (TOPROL-XL) 50 MG 24 hr tablet, Take 1 tablet by mouth Daily., Disp: 90 tablet, Rfl: 1  •  Multiple Vitamins-Minerals (CENTRUM SILVER PO), Take  by mouth., Disp: , Rfl:   •  Multiple Vitamins-Minerals (PRESERVISION AREDS PO), Take 1 tablet by mouth 2 (Two) Times a Day., Disp: , Rfl:   •  naproxen (NAPROSYN) 250 MG tablet, Take 250 mg by mouth 2 (Two) Times a Day As Needed., Disp: , Rfl:       Allergies   Allergen Reactions   • Bactrim [Sulfamethoxazole-Trimethoprim] Rash         /70   Pulse 78   Resp 14   Ht 152.4 cm (60\")   Wt 51.3 kg (113 lb)   SpO2 97%   BMI 22.07 kg/m²       Physical Exam:  Constitutional: Patient is oriented to person, place, and time.   Patient appears well-developed and well-nourished.   HEENT: Head: Normocephalic and atraumatic.   Eyes: Conjunctivae and lids are normal.   Pupils: Equal, round, reactive to light.   Neck: Trachea normal. Neck supple. No JVD present.   Pulmonary Respiratory effort: No increased work of breathing or signs of respiratory distress. Auscultation of lungs: Clear to auscultation.   Cardiovascular Auscultation of heart: Normal rate and rhythm, normal S1 and S2, " no murmurs.   Peripheral vascular exam: Femoral: right 2+, left 2+. Posterior tibialis: right 2+ and left 2+. Dorsalis pedis: right 2+ and left 2+. No edema. Musculoskeletal   Gait and station: Gait evaluation demonstrated shuffling. She is able to walk for short distances with a walker. She leans forward.  Muscles: Presence of active trigger points right levator scapulae and right trapezius   Shoulders: The range of motion of the right glenohumeral joint is severely decreased secondary to end stage osteoarthritis, tenderness and small effusion noted. Rotator cuff strength is 3/5. Positive Tinels sign at the right suprascapularis.   Lumbar spine: Passive and active range of motion are limited but without pain. Extension, flexion, lateral flexion, rotation of the lumbar spine did not increase or reproduce pain. Lumbar facet joint loading maneuvers are negative.   Maximus test and Gaenslen's test are negative   Palpation of the bilateral greater trochanter, unrevealing   Examination of the Iliotibial band: unrevealing   Hip joints: The range of motion of the hip joints is limited but without pain   Right knee: Gross right valgus deformity of the knee joint. -10 extension, 100 degrees of flexion. Mild laxity. No tenderness found. No MCL and no LCL tenderness noted. Crepitus.  Left knee: Range of motion 0-110. No ACL, PCL, LCL or MCL laxity. No tenderness found. No MCL and no LCL tenderness noted. Crepitus.   Neurological:   Patient is alert and oriented to person, place, and time.   Speech: Speech is normal.   Cortical function: Normal mental status.   Cranial nerves: Cranial nerves 2-12 intact.   Reflex Scores:  Right patellar: 1+  Left patellar: 1+  Right Achilles: 1+  Left Achilles: 1+  Motor strength: 5/5  Motor Tone: normal tone.   Involuntary movements: none.   Superficial/Primitive Reflexes: primitive reflexes were absent.   Right Spencer: absent  Left Spencer: absent  Right ankle clonus: absent  Left ankle  clonus: absent   Babinsky: absent  Negative long tract signs. Straight leg raising test is negative. Femoral stretch sign is negative.   Sensation: No sensory loss. Sensory exam: intact to light touch, intact pain and temperature sensation, intact vibration sensation and normal proprioception.   Coordination: Normal finger to nose and heel to shin. Abnormal balance. Negative Romberg's sign   Skin and subcutaneous tissue: Skin is warm and intact. No rash noted. No cyanosis.   Psychiatric: Judgment and insight: Normal. Orientation to person, place and time: Normal. Recent and remote memory: Intact. Mood and affect: Normal.     ASSESSMENT:   1. Osteoarthritis of right glenohumeral joint    2. Chronic pain in right shoulder    3. Nerve entrapment syndrome, suprascapular, right    4. Bilateral primary osteoarthritis of knee    5. At high risk for falls    6. Impaired functional mobility, balance, and endurance        PLAN/MEDICAL DECISION MAKING: I had a lengthy conversation with Ms. Anita Cook regarding her chronic pain condition and potential therapeutic options including risks, benefits, alternative therapies, to name a few. Patient has failed to obtain pain relief with conservative measures and previous interventional pain management measures, as referenced above.  She has responded well to minimally invasive interventional pain management procedures.  At this time her and her daughter feel they will call as needed and I discussed with him repeating different procedures would be an option.  I have reviewed all available patient's medical records as well as previous therapies as referenced above, and discussed the patient with Dr. Alonzo.  Therefore, I have proposed the following plan:  1. Interventional pain management measures:   A. Chronic knee pain: None indicated at this time. Patient wishes to not move forward with bipolar radiofrequency ablation of the left superior medial genicular nerve, left  superior lateral genicular nerve and left inferior medial genicular nerve at this time. Otherwise, we will obtain an MRI of the lumbar spine w/o contrast. Depending on MRI findings, we may contemplate diagnostic and therapeutic bilateral lumbar transforaminal epidural steroid injections. Other options would include a spinal cord stimulator trial or PNS trial.  B. Right shoulder pain: None indicated this time.  Per patient's request she will follow-up on as-needed basis.  I did discussed with the patient and her daughter repeating bipolar radiofrequency ablation of the right suprascapular nerve and axillary nerve. Patient presents with end-stage right glenohumeral joint osteoarthritis with severe pain both at rest and with movement of her upper extremity. She has been previously evaluated by Dr. Julien Leyva and found not to be an appropriate surgical candidate for shoulder replacement.  In addition, we could consider regenerative therapies, or peripheral nerve stimulation of the right suprascapular nerve with the Stimwave system.  2. Diagnostic studies: It has been discussed the possibility of an MRI of the lumbar spine without contrast  3. Pharmacological measures: Reviewed. Discussed.   A. Patient takes Voltaren gel, Advil, Tylenol  B. Continue with Rheumate one tablet twice daily  C. Continue with prilocaine 2%, lidocaine 10%, capsaicin 0.001% and mannitol 20%  cream, apply 1 to 2 grams of cream to the affected areas every 4 to 6 hours as needed  4. Long-term rehabilitation efforts  A. The patient does not havea history of falls. I did complete a risk assessment for falls.   B.  Patient will start a comprehensive physical therapy program at [facility] for water therapy, core strengthening, gait and balance training and dry needling once pain is under control  C. Continue with brace for for stabilization of the medial and lateral compartments.   D. Contrast therapy: Apply ice-packs for 15-20 minutes, followed  by heating pads for 15-20 minutes to affected area   5. The patient and her family have been instructed to contact my office with any questions or difficulties. The patient understands the plan and agrees to proceed accordingly.           Patient Care Team:  Shae Del Toro APRN as PCP - General (Internal Medicine)  Cecy Camacho PA-C as Physician Assistant (Physician Assistant)     No orders of the defined types were placed in this encounter.        Future Appointments   Date Time Provider Department Center   5/21/2021  9:15 AM Shae Del Toro APRN [facility]     EMR Dragon/Transcription disclaimer:  Much of this encounter note is an electronic transcription of spoken language to printed text. Electronic transcription of spoken language may permit erroneous, or at times, nonsensical words or phrases to be inadvertently transcribed. Although I have reviewed the note for such errors, some may still exist.

## 2020-12-10 ENCOUNTER — OFFICE VISIT (OUTPATIENT)
Dept: PAIN MEDICINE | Facility: CLINIC | Age: 85
End: 2020-12-10

## 2020-12-10 VITALS
WEIGHT: 113 LBS | BODY MASS INDEX: 22.19 KG/M2 | HEIGHT: 60 IN | HEART RATE: 78 BPM | RESPIRATION RATE: 14 BRPM | SYSTOLIC BLOOD PRESSURE: 120 MMHG | OXYGEN SATURATION: 97 % | DIASTOLIC BLOOD PRESSURE: 70 MMHG

## 2020-12-10 DIAGNOSIS — Z74.09 IMPAIRED FUNCTIONAL MOBILITY, BALANCE, AND ENDURANCE: ICD-10-CM

## 2020-12-10 DIAGNOSIS — M25.511 CHRONIC PAIN IN RIGHT SHOULDER: ICD-10-CM

## 2020-12-10 DIAGNOSIS — G89.29 CHRONIC PAIN IN RIGHT SHOULDER: ICD-10-CM

## 2020-12-10 DIAGNOSIS — M17.0 BILATERAL PRIMARY OSTEOARTHRITIS OF KNEE: ICD-10-CM

## 2020-12-10 DIAGNOSIS — Z91.81 AT HIGH RISK FOR FALLS: ICD-10-CM

## 2020-12-10 DIAGNOSIS — M19.011 OSTEOARTHRITIS OF RIGHT GLENOHUMERAL JOINT: ICD-10-CM

## 2020-12-10 DIAGNOSIS — G56.81: ICD-10-CM

## 2020-12-10 PROCEDURE — 99213 OFFICE O/P EST LOW 20 MIN: CPT | Performed by: NURSE PRACTITIONER

## 2021-01-13 ENCOUNTER — TELEPHONE (OUTPATIENT)
Dept: INTERNAL MEDICINE | Facility: CLINIC | Age: 86
End: 2021-01-13

## 2021-01-13 NOTE — TELEPHONE ENCOUNTER
[facility] CALLING FAXED ORDERS ON 1/7 TO BE SIGNED AND RETURNED TO FAX# [phone] ; ANY QUESTIONS CALLTRACY KIRSTEN [phone]

## 2021-01-14 ENCOUNTER — OFFICE VISIT (OUTPATIENT)
Dept: INTERNAL MEDICINE | Facility: CLINIC | Age: 86
End: 2021-01-14

## 2021-01-14 VITALS
RESPIRATION RATE: 20 BRPM | TEMPERATURE: 97.7 F | WEIGHT: 115 LBS | BODY MASS INDEX: 22.46 KG/M2 | DIASTOLIC BLOOD PRESSURE: 74 MMHG | HEART RATE: 72 BPM | SYSTOLIC BLOOD PRESSURE: 156 MMHG

## 2021-01-14 DIAGNOSIS — I10 UNCONTROLLED HYPERTENSION: Primary | ICD-10-CM

## 2021-01-14 PROCEDURE — 99214 OFFICE O/P EST MOD 30 MIN: CPT | Performed by: INTERNAL MEDICINE

## 2021-01-14 RX ORDER — METOPROLOL SUCCINATE 100 MG/1
50 TABLET, EXTENDED RELEASE ORAL DAILY
Qty: 30 TABLET | Refills: 5 | Status: SHIPPED | OUTPATIENT
Start: 2021-01-14 | End: 2021-01-21 | Stop reason: SDUPTHER

## 2021-01-14 NOTE — PROGRESS NOTES
"Subjective       Anita Cook is a 89 y.o. female.     Chief Complaint   Patient presents with   • Hypertension       History obtained from the patient and her daughter.      History of Present Illness     Patient of Rosa Del Toro.    The patient is here for Uncontrolled Hypertension.  Her blood pressure has been elevated for the past 1 to 2 months.  Blood pressure at home has been 122-168 / 68-91 ( blood pressure log reviewed).  Prior to that, it had been stable.  They are not sure what triggered this increase in blood pressure.  She had started generic Aleve several months ago, but discontinued it 3 weeks ago without change in her blood pressure.  There has been no increased stress in the patient's life.  She does follow a heart healthy diet without excess salt or caffeine.  She does not drink alcohol.  The patient walks some and does PT exercises in her wheelchair.  The patient is currently on Amlodipine, Lotensin, Metoprolol, and Aspirin.  She has been on these medications, same doses for 2 to 3 years without side effects.  She denies chest pain, shortness of breath, orthopnea, PND, MARSHALL, syncope, palpitations, lower extremity edema, claudication, lightheadedness, and dizziness.  Her daughter states she occasionally \"breathes hard\".  She does have fatigue, which is not new, as well as chronic myalgias and arthralgias.  Labs done on 11/24/2020 (CMP, CBC, and urinalysis) were essentially normal.    Current Outpatient Medications on File Prior to Visit   Medication Sig Dispense Refill   • acetaminophen (TYLENOL) 500 MG tablet Take 500 mg by mouth Every 6 (Six) Hours As Needed for Mild Pain .     • amLODIPine (NORVASC) 10 MG tablet Take 1 tablet by mouth Daily. 90 tablet 1   • aspirin 81 MG chewable tablet Chew 81 mg Daily.     • benazepril (LOTENSIN) 40 MG tablet Take 1 tablet by mouth Daily. 90 tablet 1   • Diclofenac Sodium (VOLTAREN) 1 % gel gel Apply  topically to the appropriate area as directed 2 (Two) " Times a Day. 100 g 0   • famotidine (PEPCID) 40 MG tablet Take 1 tablet by mouth Every Night. 90 tablet 1   • ibuprofen (ADVIL,MOTRIN) 200 MG tablet Take 200 mg by mouth Every 6 (Six) Hours As Needed for Mild Pain .     • latanoprost (XALATAN) 0.005 % ophthalmic solution 1 drop Every Night.     • Multiple Vitamins-Minerals (CENTRUM SILVER PO) Take  by mouth.     • Multiple Vitamins-Minerals (PRESERVISION AREDS PO) Take 1 tablet by mouth 2 (Two) Times a Day.     • naproxen (NAPROSYN) 250 MG tablet Take 250 mg by mouth 2 (Two) Times a Day As Needed.       No current facility-administered medications on file prior to visit.        Current outpatient and discharge medications have been reconciled for the patient.  Reviewed by: Lucía Murphy MD        The following portions of the patient's history were reviewed and updated as appropriate: allergies, current medications, past family history, past medical history, past social history, past surgical history and problem list.    Review of Systems   Constitutional: Positive for fatigue. Negative for unexpected weight change.   Eyes: Negative for visual disturbance.   Respiratory: Negative for cough, shortness of breath and wheezing.    Cardiovascular: Negative for chest pain, palpitations and leg swelling.        No MARSHALL, orthopnea, or claudication.   Gastrointestinal: Negative for abdominal pain, blood in stool, constipation, diarrhea, nausea and vomiting.        Denies melena.   Endocrine: Negative for polydipsia and polyuria.   Musculoskeletal: Positive for arthralgias and myalgias.   Neurological: Negative for dizziness, syncope, light-headedness and headaches.        Has memory issues, which have slightly worsened.   Psychiatric/Behavioral: Negative for decreased concentration.         Objective       Blood pressure 156/74, pulse 72, temperature 97.7 °F (36.5 °C), temperature source Temporal, resp. rate 20, weight 52.2 kg (115 lb), not currently  breastfeeding.      Physical Exam  Vitals signs and nursing note reviewed.   Constitutional:       Appearance: She is well-developed and normal weight.      Comments: Patient examined in wheelchair   Neck:      Thyroid: No thyroid mass or thyromegaly.      Vascular: No carotid bruit.   Cardiovascular:      Rate and Rhythm: Normal rate and regular rhythm.      Pulses: Normal pulses.      Heart sounds: Normal heart sounds. No murmur. No friction rub. No gallop.    Pulmonary:      Effort: Pulmonary effort is normal.      Breath sounds: Normal breath sounds.   Musculoskeletal:      Right lower leg: No edema.      Left lower leg: No edema.   Neurological:      Mental Status: She is alert.   Psychiatric:         Mood and Affect: Mood normal.         Assessment / Plan:  Diagnoses and all orders for this visit:    1. Uncontrolled hypertension (Primary)  -     metoprolol succinate XL (TOPROL-XL) 100 MG 24 hr tablet; Take 0.5 tablets by mouth Daily.  Dispense: 30 tablet; Refill: 5- INCREASED DOSE   Continue rest of current medication(s) as noted in the history of present illness.   Continue heart healthy diet and exercise as tolerated.      Return in about 1 month (around 2/14/2021) for Recheck HTN with Rosa.

## 2021-01-18 ENCOUNTER — TELEPHONE (OUTPATIENT)
Dept: INTERNAL MEDICINE | Facility: CLINIC | Age: 86
End: 2021-01-18

## 2021-01-18 NOTE — TELEPHONE ENCOUNTER
Pharmacy Name:  [facility]     Pharmacy representative name: MANUEL     Pharmacy representative phone number: [phone]    What medication are you calling in regards to: metoprolol succinate XL (TOPROL-XL) 100 MG 24 hr tablet    What question does the pharmacy have: CLARIFICATION ON DOSAGE     Who is the provider that prescribed the medication: STEFANIE

## 2021-01-21 DIAGNOSIS — I10 UNCONTROLLED HYPERTENSION: ICD-10-CM

## 2021-01-21 RX ORDER — METOPROLOL SUCCINATE 100 MG/1
100 TABLET, EXTENDED RELEASE ORAL DAILY
Qty: 30 TABLET | Refills: 5 | Status: SHIPPED | OUTPATIENT
Start: 2021-01-21 | End: 2021-08-19

## 2021-02-01 ENCOUNTER — TELEPHONE (OUTPATIENT)
Dept: INTERNAL MEDICINE | Facility: CLINIC | Age: 86
End: 2021-02-01

## 2021-02-01 DIAGNOSIS — R39.89 ABNORMAL URINE COLOR: Primary | ICD-10-CM

## 2021-02-01 NOTE — TELEPHONE ENCOUNTER
Anita Verakatja [phone]  Pt's daughter states they were suppose to come back in to the office for a standing UA order, no order in the chart. Confirmed pt saw Dr. Murphy 01/14/2021 and was advised to F/U with Katey in x1 month in order to discuss B/p medication.   Please confirm?

## 2021-02-01 NOTE — TELEPHONE ENCOUNTER
Urine order placed in chart for patient to obtain urinalysis for further evaluation.  Please make sure family is aware to obtain urine

## 2021-02-01 NOTE — TELEPHONE ENCOUNTER
Spoke with Chaparrita - daughter  [phone]  She stated Chaparrita urine was the color of cranberry juice x 2.5 days  She has  She states she drinks 30 oz daily of liquids   She states Rosa had her on a standidng order for UTI  Labs.  Alida in lab did not see the order in chart  Sterile cups and wipes left for Chaparrita to  downstairs

## 2021-02-02 ENCOUNTER — LAB (OUTPATIENT)
Dept: INTERNAL MEDICINE | Facility: CLINIC | Age: 86
End: 2021-02-02

## 2021-02-02 DIAGNOSIS — R31.9 URINARY TRACT INFECTION WITH HEMATURIA, SITE UNSPECIFIED: Primary | ICD-10-CM

## 2021-02-02 DIAGNOSIS — N39.0 URINARY TRACT INFECTION WITH HEMATURIA, SITE UNSPECIFIED: Primary | ICD-10-CM

## 2021-02-02 DIAGNOSIS — R39.89 ABNORMAL URINE COLOR: ICD-10-CM

## 2021-02-02 LAB
BILIRUB BLD-MCNC: NEGATIVE MG/DL
CLARITY, POC: ABNORMAL
COLOR UR: YELLOW
EXPIRATION DATE: ABNORMAL
GLUCOSE UR STRIP-MCNC: NEGATIVE MG/DL
KETONES UR QL: NEGATIVE
LEUKOCYTE EST, POC: ABNORMAL
Lab: ABNORMAL
NITRITE UR-MCNC: POSITIVE MG/ML
PH UR: 7 [PH] (ref 5–8)
PROT UR STRIP-MCNC: NEGATIVE MG/DL
RBC # UR STRIP: ABNORMAL /UL
SP GR UR: 1 (ref 1–1.03)
UROBILINOGEN UR QL: NORMAL

## 2021-02-02 PROCEDURE — 87186 SC STD MICRODIL/AGAR DIL: CPT | Performed by: NURSE PRACTITIONER

## 2021-02-02 PROCEDURE — 87088 URINE BACTERIA CULTURE: CPT | Performed by: NURSE PRACTITIONER

## 2021-02-02 PROCEDURE — 87086 URINE CULTURE/COLONY COUNT: CPT | Performed by: NURSE PRACTITIONER

## 2021-02-02 PROCEDURE — 81003 URINALYSIS AUTO W/O SCOPE: CPT | Performed by: NURSE PRACTITIONER

## 2021-02-02 NOTE — TELEPHONE ENCOUNTER
2nd attempt to contact Pt. Left voice mail message for return call to office. Office number given [phone].

## 2021-02-02 NOTE — TELEPHONE ENCOUNTER
Pt had slight blood and bacteria, we've sent it off for a Urine culture, and should call pt by tomorrow with preliminary results. Good verbal understanding.

## 2021-02-03 RX ORDER — CEFDINIR 300 MG/1
300 CAPSULE ORAL 2 TIMES DAILY
Qty: 20 CAPSULE | Refills: 0 | Status: SHIPPED | OUTPATIENT
Start: 2021-02-03 | End: 2021-02-22

## 2021-02-04 LAB — BACTERIA SPEC AEROBE CULT: ABNORMAL

## 2021-02-22 ENCOUNTER — OFFICE VISIT (OUTPATIENT)
Dept: INTERNAL MEDICINE | Facility: CLINIC | Age: 86
End: 2021-02-22

## 2021-02-22 VITALS
TEMPERATURE: 98.2 F | HEART RATE: 71 BPM | OXYGEN SATURATION: 97 % | RESPIRATION RATE: 16 BRPM | DIASTOLIC BLOOD PRESSURE: 78 MMHG | SYSTOLIC BLOOD PRESSURE: 162 MMHG

## 2021-02-22 DIAGNOSIS — I10 ESSENTIAL HYPERTENSION: Primary | ICD-10-CM

## 2021-02-22 DIAGNOSIS — M85.80 OSTEOPENIA, UNSPECIFIED LOCATION: ICD-10-CM

## 2021-02-22 DIAGNOSIS — K21.9 GASTROESOPHAGEAL REFLUX DISEASE, UNSPECIFIED WHETHER ESOPHAGITIS PRESENT: ICD-10-CM

## 2021-02-22 PROCEDURE — 99214 OFFICE O/P EST MOD 30 MIN: CPT | Performed by: NURSE PRACTITIONER

## 2021-02-22 PROCEDURE — 80053 COMPREHEN METABOLIC PANEL: CPT | Performed by: NURSE PRACTITIONER

## 2021-02-22 PROCEDURE — 36415 COLL VENOUS BLD VENIPUNCTURE: CPT | Performed by: NURSE PRACTITIONER

## 2021-02-22 PROCEDURE — 85025 COMPLETE CBC W/AUTO DIFF WBC: CPT | Performed by: NURSE PRACTITIONER

## 2021-02-22 NOTE — PROGRESS NOTES
"Chief Complaint: \"Left knee pain.\"         History of Present Illness:   Patient: Ms. Anita Cook, 89 y.o. female originally referred by Cecy Camacho PA-C in consultation for chronic intractable bilateral knee pain. Patient reports a several year history of pain, which began without incident.  Additionally we treat her for her ongoing chronic right shoulder pain.  I last saw her on December 10, 2020 for follow-up evaluation.  On Meme 15, 2020, she underwent bipolar radiofrequency ablation of the right suprascapular nerve and right axillary nerve, from which she reports experiencing significant functional improvement in her activity and pain levels.  She did participate in any physical therapy for her shoulder.  She reports recurrence of her knee pain over the last several weeks that is affecting her mobility.  She returns today for reevaluation of her ongoing right knee pain.    Problem #1 Right shoulder pain  Pain History: Patient reports a several year history of pain, which began without incident. Pain has progressed in intensity over the past 1 year.   Pain Description: intermittent exacerbation, described as aching and sharp sensation.   Radiation of Pain: The right shoulder pain radiates into the right forearm   Pain intensity today: 1/10  Average pain intensity last week: 5/10  Pain intensity ranges from: 1/10 to 7/10  Aggravating factors: Pain increases with rotation, lifting and certain movements.   Alleviating factors: Pain decreases with heat, analgesics and Voltaren gel.   Associated Symptoms:   Patient denies numbness or weakness in the upper extremities.     Problem #2 Knee pain  Pain Description: constant pain with intermittent exacerbation, described as aching, dull, popping, and stiff sensation.   Radiation of Pain: The bilateral knee pain does not radiate. She reports pain in her right shin that only occurs during standing or walking. She uses a walker for ambulation. She reports " elements suggesting LSS with claudication  Pain intensity today: 1/10 right knee    Average pain intensity last week: 5/10  Pain intensity ranges from: 1/10 to 9/10  Aggravating factors: Pain increases with standing longer than 2 minutes, ambulating more than 50 feet, and climbing steps  Alleviating factors: Pain decreases with sitting, lying down, and analgesics.   Associated Symptoms:   Patient denies numbness or weakness in the lower extremities.   Patient denies any new bladder or bowel problems.   Patient reports difficulties with her balance but denies recent falls      Review of previous therapies and additional medical records:  Anita Cook has already failed the following measures, including:   Conservative Measures: Oral analgesics, topical analgesics, ice and heat, physical therapy  Interventional Measures: 02/12/2020: bipolar radiofrequency ablation of the right superomedial, right superolateral and and right inferomedial genicular nerves   06/15/2020: bipolar radiofrequency ablation of the right suprascapular nerve and right axillary nerve  Patient underwent several bilateral knee injections with Cecy Camacho PA-C, with the last procedure on 12/26/2019. Patient underwent a right shoulder joint injection with Cecy Camacho PA-C,  on 12/12/2019.  Surgical Measures: No previous history of knee surgery or shoulder surgery.   Anita Cook underwent surgical consultation with Cecy Camacho PA-C on 12/12/2019, and was found not to be a surgical candidate.  Anita Cook presents with significant comorbidities including hypertension, osteoarthritis, GERD, glaucoma, osteopenia, history of stroke in 2018, history of breast cancer 11 years ago; engaged in treatment.  In terms of current analgesics, Anita Cook takes: Voltaren gel, Advil, Tylenol  I have reviewed Yogesh Report #[id number] consistent to medication reconciliation.       Global Pain Scale 01-21 2020   02-19  2020  05-27  2020  12-10  2020  02-24  2021         Pain  10  14  12  2  8         Feelings  1  0  0  1  0         Clinical outcomes  3  9  8 0  8         Activities  14  14  15  0  8         GPS Total:  28  37  35  3  24            Review of Diagnostic Studies:  Bilateral shoulder x-rays 10/17/2019: Right shoulder 3 views: Radiographs demonstrate diffuse rotator cuff arthropathy with proximal migration of the humeral head relative to the glenoid.  There is medial erosion of the glenohumeral joint.  There appears to be some slight eccentric wear posteriorly at the glenohumeral articulation  Left shoulder 3 views: Radiographs demonstrate severe glenohumeral arthritis with bone-on-bone articulation at the central portion of the glenohumeral articulation.  There does appear to be significant arthritic changes involving the glenohumeral joint with osteophytes at the inferior portion of the humeral head and glenoid.  X-Ray Bilateral Knee on 10/17/2019: I have reviewed the images with the patient and her family. Right Knee: moderate to severe tricompartmental arthritis with genu valgum alignment, periarticular osteophytes visualized in all compartments. Left Knee: mild tricompartmental osteoarthritis.     Review of Systems   Musculoskeletal: Positive for arthralgias, gait problem and joint swelling.   All other systems reviewed and are negative.        Patient Active Problem List   Diagnosis   • Gastroesophageal reflux disease   • Cataract cortical, senile, bilateral   • Frequent UTI   • Essential hypertension   • Osteopenia   • Cerebrovascular accident (CVA) (CMS/[facility])   • Macular degeneration of both eyes   • Glaucoma of right eye   • Chronic pain in right shoulder   • Bilateral primary osteoarthritis of knee   • Gait disturbance   • Lumbar stenosis with neurogenic claudication   • At high risk for falls   • Impaired functional mobility, balance, and endurance   • Hematuria   • Hypercalcemia   • Schatzki's ring    • Nonexudative age-related macular degeneration   • Uterine descensus and cystocele       Past Medical History:   Diagnosis Date   • Arthritis    • Breast cancer (CMS/HCC)    • Cataracts, bilateral    • Fall 9/4/2019   • Fractures    • Frequent UTI    • GERD (gastroesophageal reflux disease)    • Glaucoma    • History of breast cancer 9/4/2019   • Hypertension    • Low bone density    • Stroke (cerebrum) (CMS/HCC)          Past Surgical History:   Procedure Laterality Date   • BREAST SURGERY  2008   • HERNIA REPAIR  2000   • KNEE SURGERY Right 2020    Nerve ablation   • MASTECTOMY Right 2008   • MULTIPLE TOOTH EXTRACTIONS  1970   • SHOULDER SURGERY Right 2020    Nerve ablation    • THYROIDECTOMY, PARTIAL  1980   • TONSILLECTOMY           Family History   Problem Relation Age of Onset   • Hypertension Mother    • Stroke Mother    • Arthritis Father    • Stroke Maternal Aunt    • Ovarian cancer Daughter    • Breast cancer Neg Hx          Social History     Socioeconomic History   • Marital status:      Spouse name: Not on file   • Number of children: Not on file   • Years of education: Not on file   • Highest education level: Not on file   Tobacco Use   • Smoking status: Never Smoker   • Smokeless tobacco: Never Used   Substance and Sexual Activity   • Alcohol use: No     Frequency: Never   • Drug use: No   • Sexual activity: Never           Current Outpatient Medications:   •  acetaminophen (TYLENOL) 500 MG tablet, Take 500 mg by mouth Every 6 (Six) Hours As Needed for Mild Pain ., Disp: , Rfl:   •  amLODIPine (NORVASC) 10 MG tablet, Take 1 tablet by mouth Daily., Disp: 90 tablet, Rfl: 1  •  aspirin 81 MG chewable tablet, Chew 81 mg Daily., Disp: , Rfl:   •  benazepril (LOTENSIN) 40 MG tablet, Take 1 tablet by mouth Daily., Disp: 90 tablet, Rfl: 1  •  Diclofenac Sodium (VOLTAREN) 1 % gel gel, Apply  topically to the appropriate area as directed 2 (Two) Times a Day., Disp: 100 g, Rfl: 0  •  famotidine  "(PEPCID) 40 MG tablet, Take 1 tablet by mouth Every Night., Disp: 90 tablet, Rfl: 1  •  ibuprofen (ADVIL,MOTRIN) 200 MG tablet, Take 200 mg by mouth Every 6 (Six) Hours As Needed for Mild Pain ., Disp: , Rfl:   •  latanoprost (XALATAN) 0.005 % ophthalmic solution, 1 drop Every Night., Disp: , Rfl:   •  metoprolol succinate XL (TOPROL-XL) 100 MG 24 hr tablet, Take 1 tablet by mouth Daily., Disp: 30 tablet, Rfl: 5  •  Multiple Vitamins-Minerals (CENTRUM SILVER PO), Take  by mouth., Disp: , Rfl:   •  Multiple Vitamins-Minerals (PRESERVISION AREDS PO), Take 1 tablet by mouth 2 (Two) Times a Day., Disp: , Rfl:   •  naproxen (NAPROSYN) 250 MG tablet, Take 250 mg by mouth 2 (Two) Times a Day As Needed., Disp: , Rfl:       Allergies   Allergen Reactions   • Bactrim [Sulfamethoxazole-Trimethoprim] Rash         /83 (BP Location: Left arm, Patient Position: Sitting, Cuff Size: Adult)   Pulse 74   Ht 152.4 cm (60\")   Wt 52.2 kg (115 lb)   SpO2 96%   BMI 22.46 kg/m²       Physical Exam:  Constitutional: Patient is oriented to person, place, and time.   Patient appears well-developed and well-nourished.   HEENT: Head: Normocephalic and atraumatic.   Eyes: Conjunctivae and lids are normal.   Pupils: Equal, round, reactive to light.   Neck: Trachea normal. Neck supple. No JVD present.   Pulmonary Respiratory effort: No increased work of breathing or signs of respiratory distress. Auscultation of lungs: Clear to auscultation.   Cardiovascular Auscultation of heart: Normal rate and rhythm, normal S1 and S2, no murmurs.   Peripheral vascular exam: Femoral: right 2+, left 2+. Posterior tibialis: right 2+ and left 2+. Dorsalis pedis: right 2+ and left 2+. No edema. Musculoskeletal   Gait and station: Gait evaluation demonstrated shuffling. She is able to walk for short distances with a walker. She leans forward.  Muscles: Presence of active trigger points right levator scapulae and right trapezius   Shoulders: The range of " motion of the right glenohumeral joint is severely decreased secondary to end stage osteoarthritis, tenderness and small effusion noted. Rotator cuff strength is 3/5. Positive Tinels sign at the right suprascapularis.   Lumbar spine: Passive and active range of motion are limited but without pain. Extension, flexion, lateral flexion, rotation of the lumbar spine did not increase or reproduce pain. Lumbar facet joint loading maneuvers are negative.   Maximus test and Gaenslen's test are negative   Palpation of the bilateral greater trochanter, unrevealing   Examination of the Iliotibial band: unrevealing   Hip joints: The range of motion of the hip joints is limited but without pain   Right knee: Gross right valgus deformity of the knee joint. -10 extension, 100 degrees of flexion. Mild laxity. No tenderness found. No MCL and no LCL tenderness noted. Crepitus.  Left knee: Range of motion 0-110. No ACL, PCL, LCL or MCL laxity. No tenderness found. No MCL and no LCL tenderness noted. Crepitus.   Neurological:   Patient is alert and oriented to person, place, and time.   Speech: Speech is normal.   Cortical function: Normal mental status.   Cranial nerves: Cranial nerves 2-12 intact.   Reflex Scores:  Right patellar: 1+  Left patellar: 1+  Right Achilles: 1+  Left Achilles: 1+  Motor strength: 5/5  Motor Tone: normal tone.   Involuntary movements: none.   Superficial/Primitive Reflexes: primitive reflexes were absent.   Right Spencer: absent  Left Spencer: absent  Right ankle clonus: absent  Left ankle clonus: absent   Babinsky: absent  Negative long tract signs. Straight leg raising test is negative. Femoral stretch sign is negative.   Sensation: No sensory loss. Sensory exam: intact to light touch, intact pain and temperature sensation, intact vibration sensation and normal proprioception.   Coordination: Normal finger to nose and heel to shin. Abnormal balance. Negative Romberg's sign   Skin and subcutaneous tissue:  Skin is warm and intact. No rash noted. No cyanosis.   Psychiatric: Judgment and insight: Normal. Orientation to person, place and time: Normal. Recent and remote memory: Intact. Mood and affect: Normal.     ASSESSMENT:   1. Bilateral primary osteoarthritis of knee    2. Nerve entrapment syndrome, suprascapular, right    3. Osteoarthritis of both shoulders, unspecified osteoarthritis type    4. Lumbar stenosis with neurogenic claudication    5. Osteopenia, unspecified location    6. At high risk for falls    7. Impaired functional mobility, balance, and endurance        PLAN/MEDICAL DECISION MAKING: I had a lengthy conversation with Ms. Anita Cook regarding her chronic pain condition and potential therapeutic options including risks, benefits, alternative therapies, to name a few. Patient has failed to obtain pain relief with conservative measures and previous interventional pain management measures, as referenced above.  She has responded well to minimally invasive interventional pain management procedures. I have reviewed all available patient's medical records as well as previous therapies as referenced above, and discussed the patient with Dr. Alonzo.  Therefore, I have proposed the following plan:  1. Interventional pain management measures:   A. Chronic knee pain: Patient will be scheduled for one set of diagnostic right superior medial genicular nerve block, right superior lateral genicular nerve block and right inferior medial genicular nerve block. If patient experiences more than 50% pain relief along with significant improvement in the range of motion of the knee joint, then, patient will be scheduled for bipolar radiofrequency ablation of the right superomedial, superolateral and inferomedial genicular nerves. Otherwise, we will obtain an MRI of the lumbar spine w/o contrast. Depending on MRI findings, we may contemplate diagnostic and therapeutic bilateral lumbar transforaminal epidural  steroid injections. Other options would include a spinal cord stimulator trial or PNS trial.  B. Right shoulder pain: None indicated at this time.  If pain recurs we may proceed with one set of diagnostic right suprascapular nerve block and diagnostic right axillary nerve block. If patient experiences more than 50% pain relief and improvement the range of motion of the shoulder joint, then, patient will be scheduled for bipolar radiofrequency ablation of the right suprascapular nerve and axillary nerve. Patient presents with end-stage right glenohumeral joint osteoarthritis with severe pain both at rest and with movement of her upper extremity. She has been previously evaluated by Dr. Julien Leyva and found not to be an appropriate surgical candidate for shoulder replacement.  In addition, we could consider regenerative therapies, or peripheral nerve stimulation of the right suprascapular nerve with the Stimwave system.  2. Diagnostic studies: It has been discussed the possibility of an MRI of the lumbar spine without contrast  3. Pharmacological measures: Reviewed. Discussed.   A. Patient takes Voltaren gel, Advil, Tylenol  4. Long-term rehabilitation efforts  A. The patient does not havea history of falls. I did complete a risk assessment for falls.   B.  Patient will start a comprehensive physical therapy program for therapeutic exercise, core strengthening, gait and balance training and dry needling once pain is under control  C. Continue with brace for for stabilization of the medial and lateral compartments.   D. Contrast therapy: Apply ice-packs for 15-20 minutes, followed by heating pads for 15-20 minutes to affected area   5. The patient and her family have been instructed to contact my office with any questions or difficulties. The patient understands the plan and agrees to proceed accordingly.           Patient Care Team:  Shae Del Toro APRN as PCP - General (Internal Medicine)  Cecy Camacho  WILDER ALBARRAN as Physician Assistant (Physician Assistant)     No orders of the defined types were placed in this encounter.        Future Appointments   Date Time Provider Department Center   5/21/2021  9:15 AM Shae Del Toro APRN [facility]   8/27/2021  8:00 AM Shae Del Toro APRN [facility]         [facility]ANT[facility] Peters     EM[facility] Dragon/Transcription disclaimer:  Much of this encounter note is an electronic transcription of spoken language to printed text. Electronic transcription of spoken language may permit erroneous, or at times, nonsensical words or phrases to be inadvertently transcribed. Although I have reviewed the note for such errors, some may still exist.

## 2021-02-23 ENCOUNTER — LAB (OUTPATIENT)
Dept: INTERNAL MEDICINE | Facility: CLINIC | Age: 86
End: 2021-02-23

## 2021-02-23 DIAGNOSIS — R30.0 DYSURIA: Primary | ICD-10-CM

## 2021-02-23 DIAGNOSIS — N39.0 URINARY TRACT INFECTION WITH HEMATURIA, SITE UNSPECIFIED: ICD-10-CM

## 2021-02-23 DIAGNOSIS — R31.9 URINARY TRACT INFECTION WITH HEMATURIA, SITE UNSPECIFIED: ICD-10-CM

## 2021-02-23 LAB
ALBUMIN SERPL-MCNC: 4 G/DL (ref 3.5–5.2)
ALBUMIN/GLOB SERPL: 1.1 G/DL
ALP SERPL-CCNC: 69 U/L (ref 39–117)
ALT SERPL W P-5'-P-CCNC: 11 U/L (ref 1–33)
ANION GAP SERPL CALCULATED.3IONS-SCNC: 8.9 MMOL/L (ref 5–15)
AST SERPL-CCNC: 17 U/L (ref 1–32)
BASOPHILS # BLD AUTO: 0.04 10*3/MM3 (ref 0–0.2)
BASOPHILS NFR BLD AUTO: 0.7 % (ref 0–1.5)
BILIRUB BLD-MCNC: NEGATIVE MG/DL
BILIRUB SERPL-MCNC: 0.2 MG/DL (ref 0–1.2)
BUN SERPL-MCNC: 19 MG/DL (ref 8–23)
BUN/CREAT SERPL: 30.6 (ref 7–25)
CALCIUM SPEC-SCNC: 9.5 MG/DL (ref 8.2–9.6)
CHLORIDE SERPL-SCNC: 100 MMOL/L (ref 98–107)
CLARITY, POC: ABNORMAL
CO2 SERPL-SCNC: 24.1 MMOL/L (ref 22–29)
COLOR UR: YELLOW
CREAT SERPL-MCNC: 0.62 MG/DL (ref 0.57–1)
DEPRECATED RDW RBC AUTO: 46 FL (ref 37–54)
EOSINOPHIL # BLD AUTO: 0.21 10*3/MM3 (ref 0–0.4)
EOSINOPHIL NFR BLD AUTO: 3.8 % (ref 0.3–6.2)
ERYTHROCYTE [DISTWIDTH] IN BLOOD BY AUTOMATED COUNT: 13 % (ref 12.3–15.4)
EXPIRATION DATE: ABNORMAL
GFR SERPL CREATININE-BSD FRML MDRD: 90 ML/MIN/1.73
GLOBULIN UR ELPH-MCNC: 3.5 GM/DL
GLUCOSE SERPL-MCNC: 93 MG/DL (ref 65–99)
GLUCOSE UR STRIP-MCNC: NEGATIVE MG/DL
HCT VFR BLD AUTO: 35.8 % (ref 34–46.6)
HGB BLD-MCNC: 12 G/DL (ref 12–15.9)
IMM GRANULOCYTES # BLD AUTO: 0.01 10*3/MM3 (ref 0–0.05)
IMM GRANULOCYTES NFR BLD AUTO: 0.2 % (ref 0–0.5)
KETONES UR QL: NEGATIVE
LEUKOCYTE EST, POC: ABNORMAL
LYMPHOCYTES # BLD AUTO: 1.27 10*3/MM3 (ref 0.7–3.1)
LYMPHOCYTES NFR BLD AUTO: 23.1 % (ref 19.6–45.3)
Lab: ABNORMAL
MCH RBC QN AUTO: 32.7 PG (ref 26.6–33)
MCHC RBC AUTO-ENTMCNC: 33.5 G/DL (ref 31.5–35.7)
MCV RBC AUTO: 97.5 FL (ref 79–97)
MONOCYTES # BLD AUTO: 0.78 10*3/MM3 (ref 0.1–0.9)
MONOCYTES NFR BLD AUTO: 14.2 % (ref 5–12)
NEUTROPHILS NFR BLD AUTO: 3.19 10*3/MM3 (ref 1.7–7)
NEUTROPHILS NFR BLD AUTO: 58 % (ref 42.7–76)
NITRITE UR-MCNC: POSITIVE MG/ML
NRBC BLD AUTO-RTO: 0 /100 WBC (ref 0–0.2)
PH UR: 6 [PH] (ref 5–8)
PLATELET # BLD AUTO: 234 10*3/MM3 (ref 140–450)
PMV BLD AUTO: 10.1 FL (ref 6–12)
POTASSIUM SERPL-SCNC: 4.1 MMOL/L (ref 3.5–5.2)
PROT SERPL-MCNC: 7.5 G/DL (ref 6–8.5)
PROT UR STRIP-MCNC: NEGATIVE MG/DL
RBC # BLD AUTO: 3.67 10*6/MM3 (ref 3.77–5.28)
RBC # UR STRIP: ABNORMAL /UL
SODIUM SERPL-SCNC: 133 MMOL/L (ref 136–145)
SP GR UR: 1.01 (ref 1–1.03)
UROBILINOGEN UR QL: NORMAL
WBC # BLD AUTO: 5.5 10*3/MM3 (ref 3.4–10.8)

## 2021-02-23 PROCEDURE — 87186 SC STD MICRODIL/AGAR DIL: CPT

## 2021-02-23 PROCEDURE — 87086 URINE CULTURE/COLONY COUNT: CPT

## 2021-02-23 PROCEDURE — 87088 URINE BACTERIA CULTURE: CPT

## 2021-02-23 PROCEDURE — 81003 URINALYSIS AUTO W/O SCOPE: CPT | Performed by: NURSE PRACTITIONER

## 2021-02-24 ENCOUNTER — OFFICE VISIT (OUTPATIENT)
Dept: PAIN MEDICINE | Facility: CLINIC | Age: 86
End: 2021-02-24

## 2021-02-24 VITALS
DIASTOLIC BLOOD PRESSURE: 83 MMHG | SYSTOLIC BLOOD PRESSURE: 163 MMHG | OXYGEN SATURATION: 96 % | HEIGHT: 60 IN | WEIGHT: 115 LBS | BODY MASS INDEX: 22.58 KG/M2 | HEART RATE: 74 BPM

## 2021-02-24 DIAGNOSIS — Z91.81 AT HIGH RISK FOR FALLS: ICD-10-CM

## 2021-02-24 DIAGNOSIS — I10 ESSENTIAL HYPERTENSION: Primary | ICD-10-CM

## 2021-02-24 DIAGNOSIS — G56.81: ICD-10-CM

## 2021-02-24 DIAGNOSIS — Z74.09 IMPAIRED FUNCTIONAL MOBILITY, BALANCE, AND ENDURANCE: ICD-10-CM

## 2021-02-24 DIAGNOSIS — M17.0 BILATERAL PRIMARY OSTEOARTHRITIS OF KNEE: ICD-10-CM

## 2021-02-24 DIAGNOSIS — M19.012 OSTEOARTHRITIS OF BOTH SHOULDERS, UNSPECIFIED OSTEOARTHRITIS TYPE: ICD-10-CM

## 2021-02-24 DIAGNOSIS — M85.80 OSTEOPENIA, UNSPECIFIED LOCATION: ICD-10-CM

## 2021-02-24 DIAGNOSIS — M48.062 LUMBAR STENOSIS WITH NEUROGENIC CLAUDICATION: ICD-10-CM

## 2021-02-24 DIAGNOSIS — M17.0 BILATERAL PRIMARY OSTEOARTHRITIS OF KNEE: Primary | ICD-10-CM

## 2021-02-24 DIAGNOSIS — M19.011 OSTEOARTHRITIS OF BOTH SHOULDERS, UNSPECIFIED OSTEOARTHRITIS TYPE: ICD-10-CM

## 2021-02-24 PROCEDURE — 99213 OFFICE O/P EST LOW 20 MIN: CPT | Performed by: NURSE PRACTITIONER

## 2021-02-24 RX ORDER — HYDROCHLOROTHIAZIDE 12.5 MG/1
12.5 TABLET ORAL DAILY
Qty: 30 TABLET | Refills: 2 | Status: SHIPPED | OUTPATIENT
Start: 2021-02-24 | End: 2021-05-13 | Stop reason: SDUPTHER

## 2021-02-25 ENCOUNTER — TELEPHONE (OUTPATIENT)
Dept: INTERNAL MEDICINE | Facility: CLINIC | Age: 86
End: 2021-02-25

## 2021-02-25 LAB — BACTERIA SPEC AEROBE CULT: ABNORMAL

## 2021-02-25 NOTE — TELEPHONE ENCOUNTER
PATIENT HAS RESULTS FOR UTI BUT NO MEDICATION YET.  DAUGHTER WANTS TO KNOW WHAT IS GOING ON PLEASE.     PHARMACY:  [facility]    PLEASE CALL [phone]

## 2021-02-26 NOTE — TELEPHONE ENCOUNTER
PT DAUGHTER CALLED STATED THAT PT HAS NOT RECEIVED A CALL BACK AND MEDICATION FOR PT UTI.    PLEASE ADVISE.  CALL BACK:[phone]      [facility] [address] [place], [place] - [address] AT [facility]

## 2021-03-01 RX ORDER — CEPHALEXIN 500 MG/1
500 CAPSULE ORAL 2 TIMES DAILY
Qty: 20 CAPSULE | Refills: 0 | Status: SHIPPED | OUTPATIENT
Start: 2021-03-01 | End: 2021-03-23

## 2021-03-09 ENCOUNTER — TELEPHONE (OUTPATIENT)
Dept: INTERNAL MEDICINE | Facility: CLINIC | Age: 86
End: 2021-03-09

## 2021-03-09 DIAGNOSIS — M17.0 BILATERAL PRIMARY OSTEOARTHRITIS OF KNEE: Primary | ICD-10-CM

## 2021-03-09 NOTE — TELEPHONE ENCOUNTER
Brigette Link from [facility] sending over an order Del Toro needs to sign that from October. Please advise

## 2021-03-12 ENCOUNTER — APPOINTMENT (OUTPATIENT)
Dept: PREADMISSION TESTING | Facility: HOSPITAL | Age: 86
End: 2021-03-12

## 2021-03-12 PROCEDURE — C9803 HOPD COVID-19 SPEC COLLECT: HCPCS

## 2021-03-12 PROCEDURE — U0004 COV-19 TEST NON-CDC HGH THRU: HCPCS

## 2021-03-12 PROCEDURE — U0005 INFEC AGEN DETEC AMPLI PROBE: HCPCS

## 2021-03-13 LAB — SARS-COV-2 RNA NOSE QL NAA+PROBE: NOT DETECTED

## 2021-03-15 ENCOUNTER — OUTSIDE FACILITY SERVICE (OUTPATIENT)
Dept: PAIN MEDICINE | Facility: CLINIC | Age: 86
End: 2021-03-15

## 2021-03-15 DIAGNOSIS — M17.0 BILATERAL PRIMARY OSTEOARTHRITIS OF KNEE: Primary | ICD-10-CM

## 2021-03-15 PROCEDURE — 64454 NJX AA&/STRD GNCLR NRV BRNCH: CPT | Performed by: ANESTHESIOLOGY

## 2021-03-15 PROCEDURE — 99152 MOD SED SAME PHYS/QHP 5/>YRS: CPT | Performed by: ANESTHESIOLOGY

## 2021-03-16 ENCOUNTER — TELEPHONE (OUTPATIENT)
Dept: INTERNAL MEDICINE | Facility: CLINIC | Age: 86
End: 2021-03-16

## 2021-03-16 ENCOUNTER — LAB (OUTPATIENT)
Dept: INTERNAL MEDICINE | Facility: CLINIC | Age: 86
End: 2021-03-16

## 2021-03-16 ENCOUNTER — TELEPHONE (OUTPATIENT)
Dept: PAIN MEDICINE | Facility: CLINIC | Age: 86
End: 2021-03-16

## 2021-03-16 DIAGNOSIS — R82.90 UNSPECIFIED ABNORMAL FINDINGS IN URINE: ICD-10-CM

## 2021-03-16 DIAGNOSIS — Z87.440 HISTORY OF UTI: Primary | ICD-10-CM

## 2021-03-16 DIAGNOSIS — Z87.440 HISTORY OF UTI: ICD-10-CM

## 2021-03-16 LAB
BILIRUB BLD-MCNC: NEGATIVE MG/DL
CLARITY, POC: ABNORMAL
COLOR UR: YELLOW
EXPIRATION DATE: ABNORMAL
GLUCOSE UR STRIP-MCNC: NEGATIVE MG/DL
KETONES UR QL: NEGATIVE
LEUKOCYTE EST, POC: ABNORMAL
Lab: ABNORMAL
NITRITE UR-MCNC: POSITIVE MG/ML
PH UR: 6.5 [PH] (ref 5–8)
PROT UR STRIP-MCNC: NEGATIVE MG/DL
RBC # UR STRIP: ABNORMAL /UL
SP GR UR: 1 (ref 1–1.03)
UROBILINOGEN UR QL: NORMAL

## 2021-03-16 PROCEDURE — 81003 URINALYSIS AUTO W/O SCOPE: CPT | Performed by: NURSE PRACTITIONER

## 2021-03-16 PROCEDURE — 87086 URINE CULTURE/COLONY COUNT: CPT | Performed by: NURSE PRACTITIONER

## 2021-03-16 PROCEDURE — 87186 SC STD MICRODIL/AGAR DIL: CPT | Performed by: NURSE PRACTITIONER

## 2021-03-16 PROCEDURE — 87088 URINE BACTERIA CULTURE: CPT | Performed by: NURSE PRACTITIONER

## 2021-03-16 RX ORDER — CIPROFLOXACIN 250 MG/1
250 TABLET, FILM COATED ORAL 2 TIMES DAILY
Qty: 20 TABLET | Refills: 0 | Status: SHIPPED | OUTPATIENT
Start: 2021-03-16

## 2021-03-16 NOTE — TELEPHONE ENCOUNTER
LVM for patient to call us with any concerns or questions.  Patient has been scheduled for another procedure and cards have been placed in the mail.

## 2021-03-16 NOTE — TELEPHONE ENCOUNTER
PATIENT'S DAUGHTER CALLED TO SAY THAT THE PATIENT IS SHOWING SYMPTOMS OF A UTI AND SHE WOULD LIKE TO BRING IN A SAMPLE TODAY IF HER PCP CAN ORDER LABS FOR IT.     CONTACT: [phone]

## 2021-03-16 NOTE — TELEPHONE ENCOUNTER
Given the patient's history of UTI and malodorous urine patient's daughter should bring in sample to the office and I will send for UA and urine culture

## 2021-03-16 NOTE — TELEPHONE ENCOUNTER
Relayed message to daughter Chaparrita. Chaparrita verbalized good understanding and stated she will bring sample this afternoon.

## 2021-03-17 ENCOUNTER — TELEPHONE (OUTPATIENT)
Dept: INTERNAL MEDICINE | Facility: CLINIC | Age: 86
End: 2021-03-17

## 2021-03-17 NOTE — TELEPHONE ENCOUNTER
Left voice mail message for Pt or Daughter Chaparrita to call office for message from Pt PCP. Office number given.  Hub okay to relay message from PCP.  Let daughter and patient know she has urinary tract infection I have sent an antibiotic called Cipro to her pharmacy.  She should take this medication 1 tablet twice daily.  I reduce the dose to half based on her age.  She will also need to separate from multivitamins and calcium-containing products such as milk and cheese.  She may take the medication 1 hour before or 2 hours after these products

## 2021-03-17 NOTE — TELEPHONE ENCOUNTER
----- Message from SHANTA Ray sent at 3/16/2021  3:28 PM EDT -----  Let daughter and patient know she has urinary tract infection I have sent an antibiotic called Cipro to her pharmacy.  She should take this medication 1 tablet twice daily.  I reduce the dose to half based on her age.  She will also need to separate from multivitamins and calcium-containing products such as milk and cheese.  She may take the medication 1 hour before or 2 hours after these products

## 2021-03-18 LAB — BACTERIA SPEC AEROBE CULT: ABNORMAL

## 2021-03-23 ENCOUNTER — OFFICE VISIT (OUTPATIENT)
Dept: INTERNAL MEDICINE | Facility: CLINIC | Age: 86
End: 2021-03-23

## 2021-03-23 VITALS
TEMPERATURE: 98.4 F | RESPIRATION RATE: 18 BRPM | DIASTOLIC BLOOD PRESSURE: 62 MMHG | SYSTOLIC BLOOD PRESSURE: 122 MMHG | HEART RATE: 68 BPM | OXYGEN SATURATION: 98 % | BODY MASS INDEX: 22.46 KG/M2 | WEIGHT: 115 LBS

## 2021-03-23 DIAGNOSIS — Z87.440 HISTORY OF UTI: ICD-10-CM

## 2021-03-23 DIAGNOSIS — I10 ESSENTIAL HYPERTENSION: Primary | ICD-10-CM

## 2021-03-23 PROCEDURE — 80048 BASIC METABOLIC PNL TOTAL CA: CPT | Performed by: NURSE PRACTITIONER

## 2021-03-23 PROCEDURE — 99213 OFFICE O/P EST LOW 20 MIN: CPT | Performed by: NURSE PRACTITIONER

## 2021-03-24 LAB
ANION GAP SERPL CALCULATED.3IONS-SCNC: 13.1 MMOL/L (ref 5–15)
BUN SERPL-MCNC: 19 MG/DL (ref 8–23)
BUN/CREAT SERPL: 30.6 (ref 7–25)
CALCIUM SPEC-SCNC: 8.9 MG/DL (ref 8.2–9.6)
CHLORIDE SERPL-SCNC: 93 MMOL/L (ref 98–107)
CO2 SERPL-SCNC: 24.9 MMOL/L (ref 22–29)
CREAT SERPL-MCNC: 0.62 MG/DL (ref 0.57–1)
GFR SERPL CREATININE-BSD FRML MDRD: 90 ML/MIN/1.73
GLUCOSE SERPL-MCNC: 96 MG/DL (ref 65–99)
POTASSIUM SERPL-SCNC: 3.6 MMOL/L (ref 3.5–5.2)
SODIUM SERPL-SCNC: 131 MMOL/L (ref 136–145)

## 2021-04-02 ENCOUNTER — APPOINTMENT (OUTPATIENT)
Dept: PREADMISSION TESTING | Facility: HOSPITAL | Age: 86
End: 2021-04-02

## 2021-04-02 PROCEDURE — U0004 COV-19 TEST NON-CDC HGH THRU: HCPCS

## 2021-04-02 PROCEDURE — C9803 HOPD COVID-19 SPEC COLLECT: HCPCS

## 2021-04-02 PROCEDURE — U0005 INFEC AGEN DETEC AMPLI PROBE: HCPCS

## 2021-04-03 LAB — SARS-COV-2 RNA NOSE QL NAA+PROBE: NOT DETECTED

## 2021-04-05 ENCOUNTER — OUTSIDE FACILITY SERVICE (OUTPATIENT)
Dept: PAIN MEDICINE | Facility: CLINIC | Age: 86
End: 2021-04-05

## 2021-04-05 DIAGNOSIS — M17.0 BILATERAL PRIMARY OSTEOARTHRITIS OF KNEE: Primary | ICD-10-CM

## 2021-04-05 PROCEDURE — 99152 MOD SED SAME PHYS/QHP 5/>YRS: CPT | Performed by: ANESTHESIOLOGY

## 2021-04-05 PROCEDURE — 64624 DSTRJ NULYT AGT GNCLR NRV: CPT | Performed by: ANESTHESIOLOGY

## 2021-04-06 ENCOUNTER — TELEPHONE (OUTPATIENT)
Dept: PAIN MEDICINE | Facility: CLINIC | Age: 86
End: 2021-04-06

## 2021-04-06 NOTE — TELEPHONE ENCOUNTER
LVM for patient to call us with any questions regarding her R knee RFTC.     Left the appointment times and dates  For her LEFT knee RFTC.  Will be mailing out.

## 2021-04-08 DIAGNOSIS — M17.0 BILATERAL PRIMARY OSTEOARTHRITIS OF KNEE: Primary | ICD-10-CM

## 2021-04-15 ENCOUNTER — TELEPHONE (OUTPATIENT)
Dept: INTERNAL MEDICINE | Facility: CLINIC | Age: 86
End: 2021-04-15

## 2021-04-18 ENCOUNTER — APPOINTMENT (OUTPATIENT)
Dept: PREADMISSION TESTING | Facility: HOSPITAL | Age: 86
End: 2021-04-18

## 2021-04-18 LAB — SARS-COV-2 RNA NOSE QL NAA+PROBE: NOT DETECTED

## 2021-04-18 PROCEDURE — C9803 HOPD COVID-19 SPEC COLLECT: HCPCS

## 2021-04-18 PROCEDURE — U0004 COV-19 TEST NON-CDC HGH THRU: HCPCS

## 2021-04-18 PROCEDURE — U0005 INFEC AGEN DETEC AMPLI PROBE: HCPCS

## 2021-04-21 ENCOUNTER — OUTSIDE FACILITY SERVICE (OUTPATIENT)
Dept: PAIN MEDICINE | Facility: CLINIC | Age: 86
End: 2021-04-21

## 2021-04-21 PROCEDURE — 64624 DSTRJ NULYT AGT GNCLR NRV: CPT | Performed by: ANESTHESIOLOGY

## 2021-04-21 PROCEDURE — 99152 MOD SED SAME PHYS/QHP 5/>YRS: CPT | Performed by: ANESTHESIOLOGY

## 2021-04-22 ENCOUNTER — TELEPHONE (OUTPATIENT)
Dept: PAIN MEDICINE | Facility: CLINIC | Age: 86
End: 2021-04-22

## 2021-04-22 NOTE — TELEPHONE ENCOUNTER
Called Patient and talked to her daughter. She said her mother was doing really well so far. I told her if her mom started experiencing pain to rotate tylenol and ibuprofen as well as heat and cold therapy. I also gave her appointment information for her follow up with [facility] on Oct. 4th at 9:30am. She acknowledged and said she would call if she has any questions or concerns before hand.

## 2021-05-13 DIAGNOSIS — I10 ESSENTIAL HYPERTENSION: ICD-10-CM

## 2021-05-13 RX ORDER — BENAZEPRIL HYDROCHLORIDE 40 MG/1
40 TABLET, FILM COATED ORAL DAILY
Qty: 90 TABLET | Refills: 1 | Status: SHIPPED | OUTPATIENT
Start: 2021-05-13 | End: 2021-11-11

## 2021-05-13 RX ORDER — HYDROCHLOROTHIAZIDE 12.5 MG/1
12.5 TABLET ORAL DAILY
Qty: 90 TABLET | Refills: 1 | Status: SHIPPED | OUTPATIENT
Start: 2021-05-13 | End: 2021-11-19

## 2021-05-14 RX ORDER — BENAZEPRIL HYDROCHLORIDE 40 MG/1
TABLET, FILM COATED ORAL
Qty: 90 TABLET | Refills: 0 | OUTPATIENT
Start: 2021-05-14

## 2021-05-14 RX ORDER — HYDROCHLOROTHIAZIDE 12.5 MG/1
TABLET ORAL
Qty: 30 TABLET | Refills: 1 | OUTPATIENT
Start: 2021-05-14

## 2021-05-25 ENCOUNTER — TELEPHONE (OUTPATIENT)
Dept: INTERNAL MEDICINE | Facility: CLINIC | Age: 86
End: 2021-05-25

## 2021-05-26 ENCOUNTER — OFFICE VISIT (OUTPATIENT)
Dept: INTERNAL MEDICINE | Facility: CLINIC | Age: 86
End: 2021-05-26

## 2021-05-26 VITALS — OXYGEN SATURATION: 97 % | SYSTOLIC BLOOD PRESSURE: 110 MMHG | DIASTOLIC BLOOD PRESSURE: 62 MMHG | HEART RATE: 63 BPM

## 2021-05-26 DIAGNOSIS — E87.6 HYPOKALEMIA: ICD-10-CM

## 2021-05-26 DIAGNOSIS — R82.90 ABNORMAL URINE: ICD-10-CM

## 2021-05-26 DIAGNOSIS — Z87.440 HISTORY OF UTI: ICD-10-CM

## 2021-05-26 DIAGNOSIS — E86.0 DEHYDRATION: Primary | ICD-10-CM

## 2021-05-26 DIAGNOSIS — I10 ESSENTIAL HYPERTENSION: ICD-10-CM

## 2021-05-26 LAB
ANION GAP SERPL CALCULATED.3IONS-SCNC: 7.3 MMOL/L (ref 5–15)
BUN SERPL-MCNC: 13 MG/DL (ref 8–23)
BUN/CREAT SERPL: 19.4 (ref 7–25)
CALCIUM SPEC-SCNC: 9.6 MG/DL (ref 8.2–9.6)
CHLORIDE SERPL-SCNC: 94 MMOL/L (ref 98–107)
CO2 SERPL-SCNC: 29.7 MMOL/L (ref 22–29)
CREAT SERPL-MCNC: 0.67 MG/DL (ref 0.57–1)
GFR SERPL CREATININE-BSD FRML MDRD: 82 ML/MIN/1.73
GLUCOSE SERPL-MCNC: 127 MG/DL (ref 65–99)
POTASSIUM SERPL-SCNC: 3.9 MMOL/L (ref 3.5–5.2)
SODIUM SERPL-SCNC: 131 MMOL/L (ref 136–145)

## 2021-05-26 PROCEDURE — 99214 OFFICE O/P EST MOD 30 MIN: CPT | Performed by: NURSE PRACTITIONER

## 2021-05-26 PROCEDURE — 80048 BASIC METABOLIC PNL TOTAL CA: CPT | Performed by: NURSE PRACTITIONER

## 2021-05-26 PROCEDURE — 36415 COLL VENOUS BLD VENIPUNCTURE: CPT | Performed by: NURSE PRACTITIONER

## 2021-05-26 RX ORDER — METHENAMINE HIPPURATE 1000 MG/1
1 TABLET ORAL
COMMUNITY

## 2021-05-26 RX ORDER — POTASSIUM CHLORIDE 750 MG/1
10 TABLET, EXTENDED RELEASE ORAL DAILY
COMMUNITY
Start: 2021-05-24 | End: 2021-05-29

## 2021-05-26 RX ORDER — TIMOLOL MALEATE 5 MG/ML
SOLUTION/ DROPS OPHTHALMIC
COMMUNITY

## 2021-05-26 NOTE — PROGRESS NOTES
Chief Complaint  Hospital Follow Up Visit    Subjective          Anita Cook presents to [facility]  History of Present Illness  The patient comes to clinic today for hospital follow-up.  The patient was seen in [facility] at the [facility] due to possible dehydration.  She and her daughter had traveled to visit her sister in [place].  She had not been drinking as much.  There have been no nausea vomiting diarrhea fever no runny stuffy nose with her cough congestion there is no orthopnea or history of congestive heart failure she does not spend excessive amount of time outside.  Review of physical exam was normal.  Normal sinus rhythm with heart rate of 61 with left anterior hemiblock no ST changes with normal intervals.  Sat was 100%.  She was given a 500 cc bolus with potassium 10 mEq by mouth diagnosed with moderate dehydration and mild hypokalemia based on labs which noted a normal CBC CMP noting potassium 3.3 otherwise normal normal liver enzymes.  Urinalysis was normal except there was 20-50 red blood cells and white blood cells noted no culture was obtained.  She obtained IV K there and was supposed to obtain rx for 5 d K but did nto do so because she was leaving.     Her LLE ankle mild edema now was both initially.     She has seen urology and had urine which thought to be bacterial and started an antibiotic and then followed by urology and pending CT abd pelvis not sure about cystoscopy.    Objective   Vital Signs:   /62 (BP Location: Right arm, Patient Position: Sitting, Cuff Size: Adult)   Pulse 63   SpO2 97%     Physical Exam  Vitals and nursing note reviewed.   Constitutional:       Appearance: She is well-developed.   HENT:      Head: Normocephalic and atraumatic.      Right Ear: External ear normal.      Left Ear: External ear normal.      Nose: Nose normal.   Neck:      Thyroid: No thyromegaly.    Cardiovascular:      Rate and Rhythm: Normal rate and regular rhythm.   Pulmonary:      Effort: Pulmonary effort is normal.      Breath sounds: Normal breath sounds.   Abdominal:      General: Bowel sounds are normal. There is no distension.      Palpations: Abdomen is soft.      Tenderness: There is no abdominal tenderness.   Musculoskeletal:         General: Swelling (mild LLE/ankle) present.   Lymphadenopathy:      Cervical: No cervical adenopathy.   Skin:     Capillary Refill: Capillary refill takes 2 to 3 seconds.   Neurological:      Mental Status: She is alert and oriented to person, place, and time.   Psychiatric:         Behavior: Behavior normal.        Result Review :                 Assessment and Plan    Diagnoses and all orders for this visit:    1. Dehydration (Primary)  -     Cancel: Urine Culture - Urine, Urine, Random Void; Future    2. History of UTI  -     Cancel: Urine Culture - Urine, Urine, Random Void; Future    3. Abnormal urine  -     Cancel: Urine Culture - Urine, Urine, Random Void; Future    4. Hypokalemia  -     Basic metabolic panel; Future  -     Basic metabolic panel        She has had recent HCTZ started for edema.  Consider holding HCTZ however now will have her keep FOB up and compression socks.  Recheck bmp consider K supplement.      Continue with urology.    I have personally reviewed ER records including lab results and progress note.  Urinalysis was noted to have 20-50 WBCs and RBCs but no culture obtained.  Patient has a history of UTI last noted in clinic 2/21.  Would like to obtain urine culture however patient has to complete at home and bring back into us.  They know to use a clean method of the midstream urine and to maintain refrigerator until brought back in within 24 hours.  This may likely explain her decrease in appetite thirst and subsequent mild dehydration.  Follow Up   No follow-ups on file.  Patient was given instructions and counseling regarding her  condition or for health maintenance advice. Please see specific information pulled into the AVS if appropriate.     RTC/call  If symptoms worsen  Meds MOA and SE's reviewed and pt v/u

## 2021-05-27 RX ORDER — AMLODIPINE BESYLATE 10 MG/1
TABLET ORAL
Qty: 90 TABLET | Refills: 0 | Status: SHIPPED | OUTPATIENT
Start: 2021-05-27 | End: 2021-09-09

## 2021-08-05 RX ORDER — FAMOTIDINE 40 MG/1
TABLET, FILM COATED ORAL
Qty: 90 TABLET | Refills: 0 | Status: SHIPPED | OUTPATIENT
Start: 2021-08-05 | End: 2021-11-05

## 2021-08-18 DIAGNOSIS — I10 UNCONTROLLED HYPERTENSION: ICD-10-CM

## 2021-08-19 RX ORDER — METOPROLOL SUCCINATE 100 MG/1
TABLET, EXTENDED RELEASE ORAL
Qty: 90 TABLET | Refills: 0 | Status: SHIPPED | OUTPATIENT
Start: 2021-08-19 | End: 2021-11-30

## 2021-09-08 DIAGNOSIS — I10 ESSENTIAL HYPERTENSION: ICD-10-CM

## 2021-09-09 RX ORDER — AMLODIPINE BESYLATE 10 MG/1
TABLET ORAL
Qty: 90 TABLET | Refills: 0 | Status: SHIPPED | OUTPATIENT
Start: 2021-09-09

## 2021-11-05 RX ORDER — FAMOTIDINE 40 MG/1
40 TABLET, FILM COATED ORAL NIGHTLY
Qty: 90 TABLET | Refills: 0 | Status: SHIPPED | OUTPATIENT
Start: 2021-11-05

## 2021-11-05 RX ORDER — FAMOTIDINE 40 MG/1
TABLET, FILM COATED ORAL
Qty: 90 TABLET | Refills: 0 | Status: SHIPPED | OUTPATIENT
Start: 2021-11-05 | End: 2021-11-05 | Stop reason: SDUPTHER

## 2021-11-10 DIAGNOSIS — I10 ESSENTIAL HYPERTENSION: ICD-10-CM

## 2021-11-11 RX ORDER — BENAZEPRIL HYDROCHLORIDE 40 MG/1
TABLET, FILM COATED ORAL
Qty: 90 TABLET | Refills: 1 | Status: SHIPPED | OUTPATIENT
Start: 2021-11-11

## 2021-11-17 DIAGNOSIS — I10 ESSENTIAL HYPERTENSION: ICD-10-CM

## 2021-11-19 RX ORDER — HYDROCHLOROTHIAZIDE 12.5 MG/1
TABLET ORAL
Qty: 90 TABLET | Refills: 1 | Status: SHIPPED | OUTPATIENT
Start: 2021-11-19 | End: 2022-09-02

## 2021-11-24 DIAGNOSIS — I10 UNCONTROLLED HYPERTENSION: ICD-10-CM

## 2021-11-30 RX ORDER — METOPROLOL SUCCINATE 100 MG/1
TABLET, EXTENDED RELEASE ORAL
Qty: 90 TABLET | Refills: 0 | Status: SHIPPED | OUTPATIENT
Start: 2021-11-30

## 2022-05-06 ENCOUNTER — TRANSCRIBE ORDERS (OUTPATIENT)
Dept: HOME HEALTH SERVICES | Facility: HOME HEALTHCARE | Age: 87
End: 2022-05-06

## 2022-05-06 ENCOUNTER — HOME HEALTH ADMISSION (OUTPATIENT)
Dept: HOME HEALTH SERVICES | Facility: HOME HEALTHCARE | Age: 87
End: 2022-05-06

## 2022-05-06 DIAGNOSIS — I26.09 ACUTE PULMONARY EMBOLISM WITH ACUTE COR PULMONALE, UNSPECIFIED PULMONARY EMBOLISM TYPE: Primary | ICD-10-CM

## 2022-05-09 ENCOUNTER — HOME CARE VISIT (OUTPATIENT)
Dept: HOME HEALTH SERVICES | Facility: HOME HEALTHCARE | Age: 87
End: 2022-05-09

## 2022-05-09 PROCEDURE — G0299 HHS/HOSPICE OF RN EA 15 MIN: HCPCS

## 2022-05-09 NOTE — HOME HEALTH
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 Patientis now home under the care daughter. Patient is alert and oriented x 3. Patient is hard of hearing and has moments of forgetfulness. Patient VSS. Patient breath sounds are not clear in clarity and sound congested. O 2 sats are in the high 90s. Patient denies indigestion. Patient is incontinent of bladder but not of bowels. Patient has no skin breakdown. Patient uses walker to ambulate ans assist x 1.     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

## 2022-05-10 ENCOUNTER — HOME CARE VISIT (OUTPATIENT)
Dept: HOME HEALTH SERVICES | Facility: HOME HEALTHCARE | Age: 87
End: 2022-05-10

## 2022-05-10 VITALS — OXYGEN SATURATION: 95 % | SYSTOLIC BLOOD PRESSURE: 146 MMHG | HEART RATE: 64 BPM | DIASTOLIC BLOOD PRESSURE: 80 MMHG

## 2022-05-10 PROCEDURE — G0152 HHCP-SERV OF OT,EA 15 MIN: HCPCS

## 2022-05-10 NOTE — HOME HEALTH
Pt was seen today for an OT evaluation.   Pt demo. decreased I with ADLs, decreased I with functional mobility/transfers, and decreased UE ROM/strength/endurance.  Pt would benefit from skilled OT services 1W4 to address the OT problems/interventions/goals.  Pt/family in agreement with plan.

## 2022-05-11 ENCOUNTER — HOME CARE VISIT (OUTPATIENT)
Dept: HOME HEALTH SERVICES | Facility: HOME HEALTHCARE | Age: 87
End: 2022-05-11

## 2022-05-11 VITALS
RESPIRATION RATE: 18 BRPM | TEMPERATURE: 97.8 F | HEART RATE: 56 BPM | SYSTOLIC BLOOD PRESSURE: 142 MMHG | DIASTOLIC BLOOD PRESSURE: 84 MMHG | OXYGEN SATURATION: 95 %

## 2022-05-11 PROCEDURE — G0151 HHCP-SERV OF PT,EA 15 MIN: HCPCS

## 2022-05-11 NOTE — HOME HEALTH
89 y/o female with recent pulmonary embolus, 3 day hospitalization at , returned home to single story home with daughter and son-in-law.  PLOF able to ambulate short distances with 4WW and CGA, transfers with supvn, IND with bed mobility

## 2022-05-12 VITALS
HEART RATE: 79 BPM | RESPIRATION RATE: 18 BRPM | SYSTOLIC BLOOD PRESSURE: 120 MMHG | TEMPERATURE: 97.7 F | OXYGEN SATURATION: 97 % | DIASTOLIC BLOOD PRESSURE: 64 MMHG

## 2022-05-13 ENCOUNTER — HOME CARE VISIT (OUTPATIENT)
Dept: HOME HEALTH SERVICES | Facility: HOME HEALTHCARE | Age: 87
End: 2022-05-13

## 2022-05-13 VITALS
OXYGEN SATURATION: 94 % | SYSTOLIC BLOOD PRESSURE: 160 MMHG | TEMPERATURE: 97.7 F | DIASTOLIC BLOOD PRESSURE: 70 MMHG | HEART RATE: 63 BPM

## 2022-05-13 PROCEDURE — G0299 HHS/HOSPICE OF RN EA 15 MIN: HCPCS

## 2022-05-13 NOTE — HOME HEALTH
Routine Visit Note: Patient saw cardiologist today and reassured patient/family of how PE is to dissipate and that breathing will improve. Patient to continue on Eliquis. Patient was tols to resume Norvasc to controll BP. Patient BP was elevated at visit. Instructed on all medications and side effects from medications.Patient had PT/OT eval and was encouraged to perform HEP. Instructed on deep breathing exercises to help improve endurance.    Skill/education provided: Instructed on respiratory health and prevention of exacerbation. Instructed on medications dose, frequency, and side effects.     Patient/caregiver response: Patient verbalized understanding    Plan for next visit: next week    Other pertinent info: n/a

## 2022-05-17 ENCOUNTER — HOME CARE VISIT (OUTPATIENT)
Dept: HOME HEALTH SERVICES | Facility: HOME HEALTHCARE | Age: 87
End: 2022-05-17

## 2022-05-17 VITALS
DIASTOLIC BLOOD PRESSURE: 60 MMHG | TEMPERATURE: 98.3 F | RESPIRATION RATE: 18 BRPM | SYSTOLIC BLOOD PRESSURE: 120 MMHG | OXYGEN SATURATION: 96 % | HEART RATE: 62 BPM

## 2022-05-17 PROCEDURE — G0299 HHS/HOSPICE OF RN EA 15 MIN: HCPCS

## 2022-05-18 ENCOUNTER — HOME CARE VISIT (OUTPATIENT)
Dept: HOME HEALTH SERVICES | Facility: HOME HEALTHCARE | Age: 87
End: 2022-05-18

## 2022-05-18 VITALS — HEART RATE: 76 BPM | DIASTOLIC BLOOD PRESSURE: 78 MMHG | SYSTOLIC BLOOD PRESSURE: 128 MMHG | OXYGEN SATURATION: 97 %

## 2022-05-18 PROCEDURE — G0152 HHCP-SERV OF OT,EA 15 MIN: HCPCS

## 2022-05-18 NOTE — HOME HEALTH
Pt was seen today for a skilled OT session with focus on ADL retraining, functional mobility/balance/transfer training, and UE ROM/strength/endurance training.  Pt demo. good motivation/participation and demo. good progress with all goals.  Continue OT POC.

## 2022-05-19 ENCOUNTER — HOME CARE VISIT (OUTPATIENT)
Dept: HOME HEALTH SERVICES | Facility: HOME HEALTHCARE | Age: 87
End: 2022-05-19

## 2022-05-19 VITALS
DIASTOLIC BLOOD PRESSURE: 86 MMHG | HEART RATE: 74 BPM | SYSTOLIC BLOOD PRESSURE: 154 MMHG | TEMPERATURE: 97.8 F | RESPIRATION RATE: 18 BRPM | OXYGEN SATURATION: 97 %

## 2022-05-19 PROCEDURE — G0151 HHCP-SERV OF PT,EA 15 MIN: HCPCS

## 2022-05-19 NOTE — HOME HEALTH
Routine Visit Note: Patient is alert and oriented x 4, responsive and mood stable. Patient VSS. Has congested sound breath sounds possibly for PE dissovling. Patient reports cough is gone. Patient denies chest pains. Patient is continent of bladder and bowel per report. Patient has no skin breakdown.Patient has no edema to BLE. Per daughter patient has resumed lisinpril to control HTN.    Skill/education provided: SN gave patient a incentive spirometer and was instructed on homw to use and how often. Instructed on medications compliance and timeliness of medication administration. Instructed on fall prevention and transfer safety.    Patient/caregiver response: Patient/daughter verbalize understanding.    Plan for next visit: Friday    Other pertinent info: n/a

## 2022-05-20 ENCOUNTER — HOME CARE VISIT (OUTPATIENT)
Dept: HOME HEALTH SERVICES | Facility: HOME HEALTHCARE | Age: 87
End: 2022-05-20

## 2022-05-20 PROCEDURE — G0299 HHS/HOSPICE OF RN EA 15 MIN: HCPCS

## 2022-05-23 VITALS
HEART RATE: 82 BPM | OXYGEN SATURATION: 94 % | SYSTOLIC BLOOD PRESSURE: 130 MMHG | RESPIRATION RATE: 18 BRPM | DIASTOLIC BLOOD PRESSURE: 66 MMHG | TEMPERATURE: 97.7 F

## 2022-05-24 ENCOUNTER — HOME CARE VISIT (OUTPATIENT)
Dept: HOME HEALTH SERVICES | Facility: HOME HEALTHCARE | Age: 87
End: 2022-05-24

## 2022-05-24 VITALS
DIASTOLIC BLOOD PRESSURE: 74 MMHG | TEMPERATURE: 97.3 F | HEART RATE: 67 BPM | SYSTOLIC BLOOD PRESSURE: 124 MMHG | OXYGEN SATURATION: 96 % | RESPIRATION RATE: 18 BRPM

## 2022-05-24 VITALS — DIASTOLIC BLOOD PRESSURE: 72 MMHG | SYSTOLIC BLOOD PRESSURE: 130 MMHG | HEART RATE: 71 BPM | OXYGEN SATURATION: 97 %

## 2022-05-24 PROCEDURE — G0152 HHCP-SERV OF OT,EA 15 MIN: HCPCS

## 2022-05-24 PROCEDURE — G0299 HHS/HOSPICE OF RN EA 15 MIN: HCPCS

## 2022-05-24 NOTE — HOME HEALTH
Pt was seen today for OT D/C visit.  Pt has made good progress and met all goals.  Pt/family in agreement with D/C.  Pt remained at home in good condition with daily assistance from her daughter.

## 2022-05-24 NOTE — HOME HEALTH
Routine Visit Note: Patient is improving weekly. Patient breath sounds are improving. Patient blood pressure had started to elevate but is being better controlled now back on lisinopril. Patient is tolerating medications well. Patient is eating 3 meals a day. Patient has been discharged from PT/OT services. Patient report no falls. Patient deals with pain daily due to OA.    Skill/education provided: Instructed on medications, instructed to use incentive spirometer, and instructed on fall prevention.    Patient/caregiver response: Patient/daughter verbalized understanding    Plan for next visit: next week    Other pertinent info: n/a

## 2022-06-01 ENCOUNTER — HOME CARE VISIT (OUTPATIENT)
Dept: HOME HEALTH SERVICES | Facility: HOME HEALTHCARE | Age: 87
End: 2022-06-01

## 2022-06-01 VITALS
TEMPERATURE: 97.2 F | RESPIRATION RATE: 18 BRPM | HEART RATE: 87 BPM | SYSTOLIC BLOOD PRESSURE: 118 MMHG | OXYGEN SATURATION: 97 % | DIASTOLIC BLOOD PRESSURE: 62 MMHG

## 2022-06-01 PROCEDURE — G0299 HHS/HOSPICE OF RN EA 15 MIN: HCPCS

## 2022-06-07 ENCOUNTER — HOME CARE VISIT (OUTPATIENT)
Dept: HOME HEALTH SERVICES | Facility: HOME HEALTHCARE | Age: 87
End: 2022-06-07

## 2022-06-07 VITALS
DIASTOLIC BLOOD PRESSURE: 78 MMHG | SYSTOLIC BLOOD PRESSURE: 124 MMHG | TEMPERATURE: 97.8 F | RESPIRATION RATE: 16 BRPM | OXYGEN SATURATION: 97 % | HEART RATE: 73 BPM

## 2022-06-07 PROCEDURE — G0495 RN CARE TRAIN/EDU IN HH: HCPCS

## 2022-06-07 NOTE — HOME HEALTH
SNV for cardiopulmonary assessment.  Patient is doing well.  Will be ready for discharge next week if no changes.  Patient signed notice of medicare provider non-coverage form.

## 2022-06-14 ENCOUNTER — HOME CARE VISIT (OUTPATIENT)
Dept: HOME HEALTH SERVICES | Facility: HOME HEALTHCARE | Age: 87
End: 2022-06-14

## 2022-06-14 VITALS
DIASTOLIC BLOOD PRESSURE: 66 MMHG | TEMPERATURE: 97.9 F | SYSTOLIC BLOOD PRESSURE: 128 MMHG | OXYGEN SATURATION: 97 % | HEART RATE: 70 BPM | RESPIRATION RATE: 18 BRPM

## 2022-06-14 PROCEDURE — G0299 HHS/HOSPICE OF RN EA 15 MIN: HCPCS

## 2022-06-14 NOTE — HOME HEALTH
THE FOLLOWING INSTRUCTIONS WERE REVIEWED UPON DISCHARGE:    Keep your appointment with your Providers.    Call your doctor if you develop a new or worsening symptom, especially if you develop increased shortness of breath, fever, and persisent cough.    Continue to take the medications prescribed by your doctor. A medication list is attached. Be sure to keep them informed of all medications you take including over the counter herbal, vitamins/minerals and the ones you take only when needed.    Follow the Cardiac diet as ordered by your doctor.     Continue with home program as instructed by therapist (handouts given).    Instructions given to Patient and Family    Instructions provided per Visit

## 2022-08-31 DIAGNOSIS — I10 ESSENTIAL HYPERTENSION: ICD-10-CM

## 2022-09-02 RX ORDER — HYDROCHLOROTHIAZIDE 12.5 MG/1
TABLET ORAL
Qty: 90 TABLET | Refills: 0 | Status: SHIPPED | OUTPATIENT
Start: 2022-09-02